# Patient Record
Sex: FEMALE | Race: WHITE | NOT HISPANIC OR LATINO | URBAN - METROPOLITAN AREA
[De-identification: names, ages, dates, MRNs, and addresses within clinical notes are randomized per-mention and may not be internally consistent; named-entity substitution may affect disease eponyms.]

---

## 2017-12-17 ENCOUNTER — EMERGENCY (EMERGENCY)
Facility: HOSPITAL | Age: 72
LOS: 1 days | Discharge: HOME | End: 2017-12-17
Attending: EMERGENCY MEDICINE | Admitting: EMERGENCY MEDICINE
Payer: MEDICARE

## 2017-12-17 VITALS
DIASTOLIC BLOOD PRESSURE: 84 MMHG | TEMPERATURE: 98 F | RESPIRATION RATE: 18 BRPM | HEART RATE: 99 BPM | OXYGEN SATURATION: 99 % | SYSTOLIC BLOOD PRESSURE: 192 MMHG

## 2017-12-17 DIAGNOSIS — Z90.710 ACQUIRED ABSENCE OF BOTH CERVIX AND UTERUS: Chronic | ICD-10-CM

## 2017-12-17 DIAGNOSIS — Z90.49 ACQUIRED ABSENCE OF OTHER SPECIFIED PARTS OF DIGESTIVE TRACT: Chronic | ICD-10-CM

## 2017-12-17 LAB
ALBUMIN SERPL ELPH-MCNC: 4.4 G/DL — SIGNIFICANT CHANGE UP (ref 3.3–5)
ALP SERPL-CCNC: 118 U/L — SIGNIFICANT CHANGE UP (ref 40–120)
ALT FLD-CCNC: 42 U/L RC — SIGNIFICANT CHANGE UP (ref 10–45)
ANION GAP SERPL CALC-SCNC: 15 MMOL/L — SIGNIFICANT CHANGE UP (ref 5–17)
APTT BLD: 27.1 SEC — LOW (ref 27.5–37.4)
AST SERPL-CCNC: 47 U/L — HIGH (ref 10–40)
BASOPHILS # BLD AUTO: 0.1 K/UL — SIGNIFICANT CHANGE UP (ref 0–0.2)
BASOPHILS NFR BLD AUTO: 0.6 % — SIGNIFICANT CHANGE UP (ref 0–2)
BILIRUB SERPL-MCNC: 0.2 MG/DL — SIGNIFICANT CHANGE UP (ref 0.2–1.2)
BUN SERPL-MCNC: 17 MG/DL — SIGNIFICANT CHANGE UP (ref 7–23)
CALCIUM SERPL-MCNC: 9.7 MG/DL — SIGNIFICANT CHANGE UP (ref 8.4–10.5)
CHLORIDE SERPL-SCNC: 100 MMOL/L — SIGNIFICANT CHANGE UP (ref 96–108)
CK MB CFR SERPL CALC: 1.9 NG/ML — SIGNIFICANT CHANGE UP (ref 0–3.8)
CK SERPL-CCNC: 91 U/L — SIGNIFICANT CHANGE UP (ref 25–170)
CO2 SERPL-SCNC: 25 MMOL/L — SIGNIFICANT CHANGE UP (ref 22–31)
CREAT SERPL-MCNC: 0.65 MG/DL — SIGNIFICANT CHANGE UP (ref 0.5–1.3)
D DIMER BLD IA.RAPID-MCNC: 818 NG/ML DDU — HIGH
EOSINOPHIL # BLD AUTO: 0.2 K/UL — SIGNIFICANT CHANGE UP (ref 0–0.5)
EOSINOPHIL NFR BLD AUTO: 1.9 % — SIGNIFICANT CHANGE UP (ref 0–6)
GLUCOSE SERPL-MCNC: 101 MG/DL — HIGH (ref 70–99)
HCT VFR BLD CALC: 41.9 % — SIGNIFICANT CHANGE UP (ref 34.5–45)
HGB BLD-MCNC: 14.4 G/DL — SIGNIFICANT CHANGE UP (ref 11.5–15.5)
INR BLD: 0.95 RATIO — SIGNIFICANT CHANGE UP (ref 0.88–1.16)
LYMPHOCYTES # BLD AUTO: 2.6 K/UL — SIGNIFICANT CHANGE UP (ref 1–3.3)
LYMPHOCYTES # BLD AUTO: 29.7 % — SIGNIFICANT CHANGE UP (ref 13–44)
MAGNESIUM SERPL-MCNC: 2.2 MG/DL — SIGNIFICANT CHANGE UP (ref 1.6–2.6)
MCHC RBC-ENTMCNC: 33.2 PG — SIGNIFICANT CHANGE UP (ref 27–34)
MCHC RBC-ENTMCNC: 34.4 GM/DL — SIGNIFICANT CHANGE UP (ref 32–36)
MCV RBC AUTO: 96.5 FL — SIGNIFICANT CHANGE UP (ref 80–100)
MONOCYTES # BLD AUTO: 0.5 K/UL — SIGNIFICANT CHANGE UP (ref 0–0.9)
MONOCYTES NFR BLD AUTO: 5.8 % — SIGNIFICANT CHANGE UP (ref 2–14)
NEUTROPHILS # BLD AUTO: 5.4 K/UL — SIGNIFICANT CHANGE UP (ref 1.8–7.4)
NEUTROPHILS NFR BLD AUTO: 62 % — SIGNIFICANT CHANGE UP (ref 43–77)
NT-PROBNP SERPL-SCNC: 68 PG/ML — SIGNIFICANT CHANGE UP (ref 0–300)
PHOSPHATE SERPL-MCNC: 3.6 MG/DL — SIGNIFICANT CHANGE UP (ref 2.5–4.5)
PLATELET # BLD AUTO: 312 K/UL — SIGNIFICANT CHANGE UP (ref 150–400)
POTASSIUM SERPL-MCNC: 4.1 MMOL/L — SIGNIFICANT CHANGE UP (ref 3.5–5.3)
POTASSIUM SERPL-SCNC: 4.1 MMOL/L — SIGNIFICANT CHANGE UP (ref 3.5–5.3)
PROT SERPL-MCNC: 7.6 G/DL — SIGNIFICANT CHANGE UP (ref 6–8.3)
PROTHROM AB SERPL-ACNC: 10.3 SEC — SIGNIFICANT CHANGE UP (ref 9.8–12.7)
RBC # BLD: 4.34 M/UL — SIGNIFICANT CHANGE UP (ref 3.8–5.2)
RBC # FLD: 11.4 % — SIGNIFICANT CHANGE UP (ref 10.3–14.5)
SODIUM SERPL-SCNC: 140 MMOL/L — SIGNIFICANT CHANGE UP (ref 135–145)
TROPONIN T SERPL-MCNC: <0.01 NG/ML — SIGNIFICANT CHANGE UP (ref 0–0.06)
WBC # BLD: 8.7 K/UL — SIGNIFICANT CHANGE UP (ref 3.8–10.5)
WBC # FLD AUTO: 8.7 K/UL — SIGNIFICANT CHANGE UP (ref 3.8–10.5)

## 2017-12-17 PROCEDURE — 83880 ASSAY OF NATRIURETIC PEPTIDE: CPT

## 2017-12-17 PROCEDURE — 80053 COMPREHEN METABOLIC PANEL: CPT

## 2017-12-17 PROCEDURE — 82553 CREATINE MB FRACTION: CPT

## 2017-12-17 PROCEDURE — 99284 EMERGENCY DEPT VISIT MOD MDM: CPT | Mod: 25

## 2017-12-17 PROCEDURE — 99285 EMERGENCY DEPT VISIT HI MDM: CPT | Mod: 25

## 2017-12-17 PROCEDURE — 85610 PROTHROMBIN TIME: CPT

## 2017-12-17 PROCEDURE — 93010 ELECTROCARDIOGRAM REPORT: CPT

## 2017-12-17 PROCEDURE — 83735 ASSAY OF MAGNESIUM: CPT

## 2017-12-17 PROCEDURE — 71275 CT ANGIOGRAPHY CHEST: CPT

## 2017-12-17 PROCEDURE — 71275 CT ANGIOGRAPHY CHEST: CPT | Mod: 26

## 2017-12-17 PROCEDURE — 85730 THROMBOPLASTIN TIME PARTIAL: CPT

## 2017-12-17 PROCEDURE — 93005 ELECTROCARDIOGRAM TRACING: CPT

## 2017-12-17 PROCEDURE — 84484 ASSAY OF TROPONIN QUANT: CPT

## 2017-12-17 PROCEDURE — 85379 FIBRIN DEGRADATION QUANT: CPT

## 2017-12-17 PROCEDURE — 84100 ASSAY OF PHOSPHORUS: CPT

## 2017-12-17 PROCEDURE — 93971 EXTREMITY STUDY: CPT

## 2017-12-17 PROCEDURE — 85027 COMPLETE CBC AUTOMATED: CPT

## 2017-12-17 PROCEDURE — 82550 ASSAY OF CK (CPK): CPT

## 2017-12-17 PROCEDURE — 93971 EXTREMITY STUDY: CPT | Mod: 26

## 2017-12-17 RX ORDER — SODIUM CHLORIDE 9 MG/ML
1000 INJECTION INTRAMUSCULAR; INTRAVENOUS; SUBCUTANEOUS ONCE
Qty: 0 | Refills: 0 | Status: COMPLETED | OUTPATIENT
Start: 2017-12-17 | End: 2017-12-17

## 2017-12-17 RX ADMIN — SODIUM CHLORIDE 1000 MILLILITER(S): 9 INJECTION INTRAMUSCULAR; INTRAVENOUS; SUBCUTANEOUS at 23:05

## 2017-12-17 NOTE — ED PROVIDER NOTE - MUSCULOSKELETAL, MLM
Spine appears normal, range of motion is not limited, no muscle or joint tenderness. No palpable masses of popliteal fossa

## 2017-12-17 NOTE — ED PROVIDER NOTE - ATTENDING CONTRIBUTION TO CARE
Attending MD Carranza: I personally have seen and examined this patient.  Resident note reviewed and agree on plan of care and except where noted.  See below for details.    72F with no reported PMH, does not see MDs other than an annual health fair screening presents to the ED with RLE pain.  Indicates R popliteal fossa area, reports pain started on Thursday.  Reports sent to ED from urgent care to rule out DVT.  Reports pain is dull and radiates down lower leg.  Reports worse with standing from sitting/supine.  Reports was sent in because of an elevated D-dimer.  Reports was scheduled to have a duplex tomorrow but given elevated dimer was sent in today.  Denies chest pain, shortness of breath, palpitations. Denies abdominal pain, nausea, vomiting, diarrhea, blood in stools. Denies trauma.  Denies fevers, chills, dizziness, weakness. Denies recent immobilization, surgery.  Reports flew to [place] 3 weeks ago.  ON exam, NAD, head NCAT, PERRL, FROM at neck, no tenderness to palpation or stepoffs along length of spine, lungs CTAB with good inspiratory effort, +S1S2, no m/r/g, abdomen soft with +BS, NT, ND, no CVAT, moving all extremities with 5/5 strength bilateral upper and lower extremities, good and equal  strength bilaterally, no calf tenderness, warmth, or erythema, no gross deformity of RLE, +mild tenderness to R popliteal fossa with no area of fluctuance or induration noted; A/P: 72F with R popliteal fossa pain and elevated dimer, will send for RLE U/S, labs, EKG, reassess

## 2017-12-17 NOTE — ED PROVIDER NOTE - MEDICAL DECISION MAKING DETAILS
Patient is a 73 yo F w/ no PMH (does not have PMD, goes to annual health fair screenings) p/w RLE pain. ?elevated DDimer at urgent care. HD stable. No tachycardia or respiratory distress. Will check cbc, cmp, coags, ddimer, RLE duplex Patient is a 73 yo F w/ no PMH (does not have PMD, goes to annual health fair screenings) p/w RLE pain. ?elevated DDimer at urgent care. HD stable. No tachycardia or respiratory distress. Will check cbc, cmp, coags, ddimer, Kem, BNP, RLE duplex.

## 2017-12-17 NOTE — ED ADULT NURSE NOTE - OBJECTIVE STATEMENT
c/o intermittent pain in the right leg and knee area since november  patient report she got into an elevated car on Thursday and had to put all her weight on her leg.   patient report pain in the leg, and unable to put weight on her leg.  tigre reports she got a call back from the urgent care that her blood work was positive for dvt and advised to come to the emergency room.   patient denies any chest pain, no shortness of breath, no swelling, no redness

## 2017-12-17 NOTE — ED PROVIDER NOTE - CHPI ED SYMPTOMS NEG
no tingling/no weakness/no vomiting/no nausea/no dizziness/no fever/no decreased eating/drinking/no numbness/no chills

## 2017-12-17 NOTE — ED PROVIDER NOTE - OBJECTIVE STATEMENT
Patient is a 71 yo F w/ no PMH (does not have PMD, goes to annual health fair screenings) p/w RLE pain. As per patient, she had R popliteal fossa pain since Thursday. Pain is dull, 1/10 in intensity and radiates down her R shin. Pain is worse with standing from a seated position. Denies any recent long flights or drives (last flight to [place] 3 weeks ago), trauma to leg, SOB, CP, palpitations, fevers, chills. Patient went to urgent care this afternoon and had DDimer drawn with plans for RLE duplex the next morning. Patient received callback from PA regarding elevated DDimer and was told to go to the ED for further eval.

## 2017-12-17 NOTE — ED PROVIDER NOTE - PROGRESS NOTE DETAILS
Duplex negative for DVT. Attending MD Carranza: Discussed U/S results with patient, will obtain CTA Chest r/o PE Attending MD Carranza: Patient re-evaluated no acute issues at  this time.  Lab and radiology tests reviewed with patient and friend.  Findings of nodule, thyroid and intracap rupture discussed with patient.  Patient stable for discharge.  Follow up instructions given, importance of follow up emphasized, return to ED parameters reviewed and patient verbalized understanding.  All questions answered, all concerns addressed.

## 2017-12-18 ENCOUNTER — APPOINTMENT (OUTPATIENT)
Dept: CT IMAGING | Facility: CLINIC | Age: 72
End: 2017-12-18

## 2017-12-18 VITALS
HEART RATE: 85 BPM | DIASTOLIC BLOOD PRESSURE: 92 MMHG | TEMPERATURE: 98 F | OXYGEN SATURATION: 97 % | RESPIRATION RATE: 18 BRPM | SYSTOLIC BLOOD PRESSURE: 162 MMHG

## 2017-12-18 PROBLEM — Z00.00 ENCOUNTER FOR PREVENTIVE HEALTH EXAMINATION: Status: ACTIVE | Noted: 2017-12-18

## 2018-01-16 ENCOUNTER — TRANSCRIPTION ENCOUNTER (OUTPATIENT)
Age: 73
End: 2018-01-16

## 2018-01-16 ENCOUNTER — INPATIENT (INPATIENT)
Facility: HOSPITAL | Age: 73
LOS: 7 days | Discharge: HOME | DRG: 40 | End: 2018-01-24
Attending: PSYCHIATRY & NEUROLOGY | Admitting: PSYCHIATRY & NEUROLOGY
Payer: MEDICARE

## 2018-01-16 VITALS — RESPIRATION RATE: 18 BRPM | DIASTOLIC BLOOD PRESSURE: 90 MMHG | HEART RATE: 92 BPM | SYSTOLIC BLOOD PRESSURE: 162 MMHG

## 2018-01-16 DIAGNOSIS — Z90.710 ACQUIRED ABSENCE OF BOTH CERVIX AND UTERUS: Chronic | ICD-10-CM

## 2018-01-16 DIAGNOSIS — Z90.49 ACQUIRED ABSENCE OF OTHER SPECIFIED PARTS OF DIGESTIVE TRACT: Chronic | ICD-10-CM

## 2018-01-16 LAB
ALBUMIN SERPL ELPH-MCNC: 4.3 G/DL — SIGNIFICANT CHANGE UP (ref 3.3–5)
ALP SERPL-CCNC: 101 U/L — SIGNIFICANT CHANGE UP (ref 40–120)
ALT FLD-CCNC: 30 U/L RC — SIGNIFICANT CHANGE UP (ref 10–45)
ANION GAP SERPL CALC-SCNC: 14 MMOL/L — SIGNIFICANT CHANGE UP (ref 5–17)
APTT BLD: 27.5 SEC — SIGNIFICANT CHANGE UP (ref 27.5–37.4)
AST SERPL-CCNC: 22 U/L — SIGNIFICANT CHANGE UP (ref 10–40)
BASOPHILS # BLD AUTO: 0.1 K/UL — SIGNIFICANT CHANGE UP (ref 0–0.2)
BASOPHILS NFR BLD AUTO: 0.9 % — SIGNIFICANT CHANGE UP (ref 0–2)
BILIRUB SERPL-MCNC: 0.4 MG/DL — SIGNIFICANT CHANGE UP (ref 0.2–1.2)
BUN SERPL-MCNC: 12 MG/DL — SIGNIFICANT CHANGE UP (ref 7–23)
CALCIUM SERPL-MCNC: 9.4 MG/DL — SIGNIFICANT CHANGE UP (ref 8.4–10.5)
CHLORIDE SERPL-SCNC: 102 MMOL/L — SIGNIFICANT CHANGE UP (ref 96–108)
CK MB CFR SERPL CALC: 1 NG/ML — SIGNIFICANT CHANGE UP (ref 0–3.8)
CK SERPL-CCNC: 61 U/L — SIGNIFICANT CHANGE UP (ref 25–170)
CO2 SERPL-SCNC: 25 MMOL/L — SIGNIFICANT CHANGE UP (ref 22–31)
CREAT SERPL-MCNC: 0.59 MG/DL — SIGNIFICANT CHANGE UP (ref 0.5–1.3)
EOSINOPHIL # BLD AUTO: 0.1 K/UL — SIGNIFICANT CHANGE UP (ref 0–0.5)
EOSINOPHIL NFR BLD AUTO: 0.9 % — SIGNIFICANT CHANGE UP (ref 0–6)
GLUCOSE SERPL-MCNC: 107 MG/DL — HIGH (ref 70–99)
HCT VFR BLD CALC: 39.4 % — SIGNIFICANT CHANGE UP (ref 34.5–45)
HGB BLD-MCNC: 13.8 G/DL — SIGNIFICANT CHANGE UP (ref 11.5–15.5)
INR BLD: 1.01 RATIO — SIGNIFICANT CHANGE UP (ref 0.88–1.16)
LYMPHOCYTES # BLD AUTO: 2 K/UL — SIGNIFICANT CHANGE UP (ref 1–3.3)
LYMPHOCYTES # BLD AUTO: 29.1 % — SIGNIFICANT CHANGE UP (ref 13–44)
MCHC RBC-ENTMCNC: 33.5 PG — SIGNIFICANT CHANGE UP (ref 27–34)
MCHC RBC-ENTMCNC: 34.9 GM/DL — SIGNIFICANT CHANGE UP (ref 32–36)
MCV RBC AUTO: 95.9 FL — SIGNIFICANT CHANGE UP (ref 80–100)
MONOCYTES # BLD AUTO: 0.4 K/UL — SIGNIFICANT CHANGE UP (ref 0–0.9)
MONOCYTES NFR BLD AUTO: 6.3 % — SIGNIFICANT CHANGE UP (ref 2–14)
NEUTROPHILS # BLD AUTO: 4.2 K/UL — SIGNIFICANT CHANGE UP (ref 1.8–7.4)
NEUTROPHILS NFR BLD AUTO: 62.9 % — SIGNIFICANT CHANGE UP (ref 43–77)
PLATELET # BLD AUTO: 271 K/UL — SIGNIFICANT CHANGE UP (ref 150–400)
POTASSIUM SERPL-MCNC: 3.8 MMOL/L — SIGNIFICANT CHANGE UP (ref 3.5–5.3)
POTASSIUM SERPL-SCNC: 3.8 MMOL/L — SIGNIFICANT CHANGE UP (ref 3.5–5.3)
PROT SERPL-MCNC: 7.5 G/DL — SIGNIFICANT CHANGE UP (ref 6–8.3)
PROTHROM AB SERPL-ACNC: 11 SEC — SIGNIFICANT CHANGE UP (ref 9.8–12.7)
RBC # BLD: 4.11 M/UL — SIGNIFICANT CHANGE UP (ref 3.8–5.2)
RBC # FLD: 11.2 % — SIGNIFICANT CHANGE UP (ref 10.3–14.5)
SODIUM SERPL-SCNC: 141 MMOL/L — SIGNIFICANT CHANGE UP (ref 135–145)
TROPONIN T SERPL-MCNC: <0.01 NG/ML — SIGNIFICANT CHANGE UP (ref 0–0.06)
WBC # BLD: 6.8 K/UL — SIGNIFICANT CHANGE UP (ref 3.8–10.5)
WBC # FLD AUTO: 6.8 K/UL — SIGNIFICANT CHANGE UP (ref 3.8–10.5)

## 2018-01-16 PROCEDURE — 70496 CT ANGIOGRAPHY HEAD: CPT | Mod: 26

## 2018-01-16 PROCEDURE — 70498 CT ANGIOGRAPHY NECK: CPT | Mod: 26

## 2018-01-16 PROCEDURE — 99285 EMERGENCY DEPT VISIT HI MDM: CPT

## 2018-01-16 RX ORDER — MECLIZINE HCL 12.5 MG
25 TABLET ORAL ONCE
Qty: 0 | Refills: 0 | Status: COMPLETED | OUTPATIENT
Start: 2018-01-16 | End: 2018-01-16

## 2018-01-16 RX ORDER — ACETAMINOPHEN 500 MG
1000 TABLET ORAL ONCE
Qty: 0 | Refills: 0 | Status: COMPLETED | OUTPATIENT
Start: 2018-01-16 | End: 2018-01-16

## 2018-01-16 RX ORDER — ASPIRIN/CALCIUM CARB/MAGNESIUM 324 MG
81 TABLET ORAL DAILY
Qty: 0 | Refills: 0 | Status: DISCONTINUED | OUTPATIENT
Start: 2018-01-16 | End: 2018-01-24

## 2018-01-16 RX ADMIN — Medication 1000 MILLIGRAM(S): at 20:09

## 2018-01-16 RX ADMIN — Medication 400 MILLIGRAM(S): at 17:31

## 2018-01-16 RX ADMIN — Medication 25 MILLIGRAM(S): at 20:18

## 2018-01-16 NOTE — ED CDU PROVIDER INITIAL DAY NOTE - MEDICAL DECISION MAKING DETAILS
Figueroa:  word finding difficulty, now resolved, likely TIA, will need MRI and echo for stroke work up

## 2018-01-16 NOTE — ED ADULT NURSE NOTE - OBJECTIVE STATEMENT
71 y/o female presenting to the ED via EMS complaining of headache x Sunday. Per daughter patient was "acting funny on the phone today." Daughter states patient was not making sense and could not find the words to speak today. On arrival patient finding difficulty remembering certain words. Per daughter patient had right facial droop when she arrived at house. Right facial droop noted on arrival. Code Stroke initiated at 1232. Last seen normal yesterday per daughter. Daughter noted onset of symptoms at 10am. FS done 99. Patient brought to CT. 18G IV established. Positive bilateral strength and sensation noted to all extremities. No weakness noted. Patient denies vision changes, dizziness, numbness, tingling, SOB. A&Ox2, Patient unsure of date. Safety and comfort measures provided. Daughter at bedside.

## 2018-01-16 NOTE — ED ADULT NURSE REASSESSMENT NOTE - NS ED NURSE REASSESS COMMENT FT1
Pt received from KELLY Jean-Baptiste. Pt oriented to CDU & plan of care was discussed. Pt AO2. On examination pt able to answer name and location correctly but does not know birth date, current president or first president of the [place]. Pt also experiencing expressive aphasia. Pt family also agrees that she is not herself. DOUGLAS Hermosillo aware of all symptoms. Safety & comfort measures maintained. Call bell in reach. Will continue to monitor. Pt received from KELLY Jean-Baptiste. Pt oriented to CDU & plan of care was discussed. Pt AO1-2. On examination pt able to answer name and location correctly but does not know birth date, current president or first president of the [place]. Pt also experiencing expressive aphasia. Pt family also agrees that she is not herself. DOUGLAS Hermosillo aware of all symptoms. Safety & comfort measures maintained. Call bell in reach. Will continue to monitor.

## 2018-01-16 NOTE — ED ADULT NURSE REASSESSMENT NOTE - NS ED NURSE REASSESS COMMENT FT1
Pt report received from  Aby MENDOZA. Pt transferred to cdu bed 4 fo neurological observation. Pt a/ox3 denies respiratory distress, sob, dyspnea, C/P, palpitations, n/v/d at this time. Pt states symptoms have improved, currently awaiting MRI and ECHO at this time. VSS, afebrile, IV clean/dry/intact. Pt aware of plan of care, call bell within reach ,safety maintained. Will monitor and assess.

## 2018-01-16 NOTE — ED PROVIDER NOTE - PHYSICAL EXAMINATION
Gen:  alert, awake, no acute distress  HEENT:  atraumatic head, airway clear, pupils equal and round  CV:  rrr, nl S1, S2, no m/r/g  Pulm:  BS equal b/l  Abd: s/nt/nd, +BS  Ext:  moving all extremities  Neuro:  grossly intact, no deficits  Skin:  clear, dry, intact   NIHSS=0

## 2018-01-16 NOTE — ED PROVIDER NOTE - OBJECTIVE STATEMENT
pt seen and evaluated on arrival in RED. pt seen and evaluated on arrival in RED.  pt's daughter reports episode this morning of difficulty speaking and word finding difficulty which has now completely resolved.  pt was aware of this difficulty and thought it might be because she was tired.

## 2018-01-16 NOTE — ED CDU PROVIDER INITIAL DAY NOTE - OBJECTIVE STATEMENT
73yo F with no significant PMH presenting with episode this morning of difficulty speaking and word finding this morning. Per patient's daughter at bedside, patient was disoriented and confused when she called her at 945 this morning, thought she was just tired. Pt's daughter then called by patient's friend also reporting patient's confusion. Pt acknowledges 71yo F with no significant PMH presenting with episode this morning of difficulty speaking, resolved by time patient got to ED. Per patient's daughter at bedside, patient was disoriented and confused when she called her at 945 this morning, thought she was just tired, and was going to call her back. Pt's daughter then called by patient's friend also reporting patient's confusion.  Per daughter, patient typically A&O x 3, never experienced her like this before. Pt acknowledges episode, reports she knew what words she wanted to say but had difficulty speaking them. Also reports associated bitemporal headache x 2 days.  Patient also endorses unsteady gait. Denies fever/chills, vision changes, CP, SOB, abd pain, n/v, numbness/tingling/weakness.   In ED, VSS. Labs unremarkable. Trop negative CTH and CTA negative. OF note, patient with new murmur with no workup. Will admit to CDU for tele, neuro checks, MRI, and TTE.     PCP Tamiko Hernandez 73yo F with no significant PMH presenting with episode this morning of difficulty speaking, resolved by time patient got to ED. Per patient's daughter at bedside, patient was disoriented and confused when she called her at 945 this morning, thought she was just tired, and was going to call her back. Pt's daughter then called by patient's friend also reporting patient's confusion.  Per daughter, patient typically A&O x 3, never experienced her like this before. Pt acknowledges episode, reports she knew what words she wanted to say but had difficulty speaking them. Also reports associated bitemporal headache x 2 days.  Patient also endorses unsteady gait. Denies fever/chills, vision changes, CP, SOB, abd pain, n/v, numbness/tingling/weakness.   In ED, VSS. Labs unremarkable. Trop negative CTH and CTA negative. OF note, patient with recent dx of cardiac murmur with no workup. Will admit to CDU for tele, neuro checks, MRI, and TTE.     PCP Tamiko Hernandez

## 2018-01-16 NOTE — CONSULT NOTE ADULT - SUBJECTIVE AND OBJECTIVE BOX
Neurology Consult    Name  JANINE RESENDIZ    Patient is a 72 year old female w/ no significant PMHx presenting w/ word-finding difficulty and R facial droop since 10PM last night.  She reports that she had been having some word-finding difficulties since last night, however she did not follow up on it and went to bed.  She woke up still having the same symptoms as well as a retro-orbital headache.  Her friend noticed on the phone that the patient was not making sense, and told her daughter about it.  The patient was then brought to the ED for her continued symptoms.  Code stroke called after patient examined in ED, NIHSS 3, MRS 0, tPA not administered as patient out of window, and no intervention due to low NIHSS.                                                          MEDICATIONS  (STANDING):    MEDICATIONS  (PRN):      Allergies    penicillin (Rash)    Intolerances        Objective  Vital Signs Last 24 Hrs  T(C): 36.6 (16 Jan 2018 13:10), Max: 36.6 (16 Jan 2018 13:10)  T(F): 97.9 (16 Jan 2018 13:10), Max: 97.9 (16 Jan 2018 13:10)  HR: 89 (16 Jan 2018 13:10) (89 - 92)  BP: 162/78 (16 Jan 2018 13:10) (162/78 - 162/90)  BP(mean): --  RR: 18 (16 Jan 2018 13:10) (18 - 18)  SpO2: 95% (16 Jan 2018 13:10) (95% - 95%)    General Exam   General appearance: No acute distress, well-nourished  Respiratory:    non-labored respirations               Neurological Exam  Mental Status:  alert and oriented x3, fluent speech, following commands, repetition and naming intact    Cranial Nerves: PERRL, EOMI without nystagmus, visual fields intact, mild R facial droop, no dysarthria    Motor:   Tone:   normal               Strength:  Upper extremity                          Delt       Bicep    Tricep                                                  R         5/5        5/5        5/5       5/5                                               L          5/5        5/5        5/5      5/5    Lower extremity                           HF          KE          KF        DF         PF                                               R        5/5        5/5        5/5       5/5       5/5                                               L         5/5        5/5        5/5      5/5        5/5    Pronator drift:   none           Dysmetria: none with finger-to-nose testing  Tremor:  none appreciated at rest or in action    Sensation: intact grossly to light touch    Deep Tendon Reflexes: 2+ throughout  Toes flexor bilaterally     Gait: normal    Other Studies    01-16    141  |  102  |  12  ----------------------------<  107<H>  3.8   |  25  |  0.59    Ca    9.4      16 Jan 2018 13:03    TPro  7.5  /  Alb  4.3  /  TBili  0.4  /  DBili  x   /  AST  22  /  ALT  30  /  AlkPhos  101  01-16 01-16    141  |  102  |  12  ----------------------------<  107<H>  3.8   |  25  |  0.59    Ca    9.4      16 Jan 2018 13:03    TPro  7.5  /  Alb  4.3  /  TBili  0.4  /  DBili  x   /  AST  22  /  ALT  30  /  AlkPhos  101  01-16    LIVER FUNCTIONS - ( 16 Jan 2018 13:03 )  Alb: 4.3 g/dL / Pro: 7.5 g/dL / ALK PHOS: 101 U/L / ALT: 30 U/L RC / AST: 22 U/L / GGT: x             Radiology    CTH:  Mild atrophy and small vessel white matter ischemic changes.   Normal CTA of the head and neck. No large vessel occlusion. No carotid or   vertebral stenosis in the neck using NASCET criteria.

## 2018-01-16 NOTE — CONSULT NOTE ADULT - ASSESSMENT
72 year old female w/ no significant PMHx presenting w/ word-finding difficulty and R facial droop since 10PM last night, s/p code stroke, NIHSS 3, MRS 0, no tPA administered as patient out of window, no intervention as patient NIHSS was too low.    Plan:  - MRI brain w/o contrast  - TTE w/ bubble 72 year old female w/ no significant PMHx presenting w/ word-finding difficulty and R facial droop since 10PM last night, s/p code stroke, NIHSS 3, MRS 0, no tPA administered as patient out of window, no intervention as patient NIHSS was too low.  Neurological exam significant for R facial droop, otherwise non-focal.  CTH and CTA head/neck showing no acute abnormalities.    Plan:  - MRI brain w/o contrast  - TTE w/ bubble

## 2018-01-16 NOTE — ED CDU PROVIDER INITIAL DAY NOTE - PROGRESS NOTE DETAILS
Patient resting in bed comfortably. NAD. Reports bitemporal HA returning. VSS. No events on tele. Will give tylenol and re-assess. MRI scheduled for tomorrow morning, 0930. CDU NOTE DOUGLAS Sanderson: pt resting comfortably, reports still feeling dizzy and unsteady. not room-spinning. not pre-syncopal. no other complaints. NAD VSS. no events on tele. neuro exam- no focal deficit.

## 2018-01-17 DIAGNOSIS — R47.01 APHASIA: ICD-10-CM

## 2018-01-17 LAB
APPEARANCE UR: CLEAR — SIGNIFICANT CHANGE UP
BILIRUB UR-MCNC: NEGATIVE — SIGNIFICANT CHANGE UP
CHOLEST SERPL-MCNC: 194 MG/DL — SIGNIFICANT CHANGE UP (ref 10–199)
COLOR SPEC: YELLOW — SIGNIFICANT CHANGE UP
DIFF PNL FLD: NEGATIVE — SIGNIFICANT CHANGE UP
EPI CELLS # UR: SIGNIFICANT CHANGE UP /HPF
GLUCOSE UR QL: NEGATIVE — SIGNIFICANT CHANGE UP
HBA1C BLD-MCNC: 5.4 % — SIGNIFICANT CHANGE UP (ref 4–5.6)
HDLC SERPL-MCNC: 72 MG/DL — SIGNIFICANT CHANGE UP (ref 40–125)
KETONES UR-MCNC: ABNORMAL
LEUKOCYTE ESTERASE UR-ACNC: ABNORMAL
LIPID PNL WITH DIRECT LDL SERPL: 103 MG/DL — SIGNIFICANT CHANGE UP
NITRITE UR-MCNC: NEGATIVE — SIGNIFICANT CHANGE UP
PH UR: 5.5 — SIGNIFICANT CHANGE UP (ref 5–8)
PROT UR-MCNC: NEGATIVE — SIGNIFICANT CHANGE UP
SP GR SPEC: 1.02 — SIGNIFICANT CHANGE UP (ref 1.01–1.02)
TOTAL CHOLESTEROL/HDL RATIO MEASUREMENT: 2.7 RATIO — LOW (ref 3.3–7.1)
TRIGL SERPL-MCNC: 95 MG/DL — SIGNIFICANT CHANGE UP (ref 10–149)
UROBILINOGEN FLD QL: NEGATIVE — SIGNIFICANT CHANGE UP
WBC UR QL: SIGNIFICANT CHANGE UP /HPF (ref 0–5)

## 2018-01-17 PROCEDURE — 70551 MRI BRAIN STEM W/O DYE: CPT | Mod: 26

## 2018-01-17 PROCEDURE — 99223 1ST HOSP IP/OBS HIGH 75: CPT

## 2018-01-17 RX ORDER — SODIUM CHLORIDE 9 MG/ML
1000 INJECTION INTRAMUSCULAR; INTRAVENOUS; SUBCUTANEOUS
Qty: 0 | Refills: 0 | Status: DISCONTINUED | OUTPATIENT
Start: 2018-01-17 | End: 2018-01-24

## 2018-01-17 RX ORDER — DIAZEPAM 5 MG
5 TABLET ORAL ONCE
Qty: 0 | Refills: 0 | Status: DISCONTINUED | OUTPATIENT
Start: 2018-01-17 | End: 2018-01-18

## 2018-01-17 RX ORDER — CLOPIDOGREL BISULFATE 75 MG/1
75 TABLET, FILM COATED ORAL DAILY
Qty: 0 | Refills: 0 | Status: DISCONTINUED | OUTPATIENT
Start: 2018-01-17 | End: 2018-01-20

## 2018-01-17 RX ORDER — ATORVASTATIN CALCIUM 80 MG/1
80 TABLET, FILM COATED ORAL AT BEDTIME
Qty: 0 | Refills: 0 | Status: DISCONTINUED | OUTPATIENT
Start: 2018-01-17 | End: 2018-01-24

## 2018-01-17 RX ORDER — ENOXAPARIN SODIUM 100 MG/ML
40 INJECTION SUBCUTANEOUS EVERY 24 HOURS
Qty: 0 | Refills: 0 | Status: DISCONTINUED | OUTPATIENT
Start: 2018-01-17 | End: 2018-01-24

## 2018-01-17 RX ORDER — SENNA PLUS 8.6 MG/1
2 TABLET ORAL AT BEDTIME
Qty: 0 | Refills: 0 | Status: DISCONTINUED | OUTPATIENT
Start: 2018-01-17 | End: 2018-01-24

## 2018-01-17 RX ORDER — ACETAMINOPHEN 500 MG
650 TABLET ORAL EVERY 6 HOURS
Qty: 0 | Refills: 0 | Status: DISCONTINUED | OUTPATIENT
Start: 2018-01-17 | End: 2018-01-24

## 2018-01-17 RX ADMIN — SODIUM CHLORIDE 60 MILLILITER(S): 9 INJECTION INTRAMUSCULAR; INTRAVENOUS; SUBCUTANEOUS at 10:06

## 2018-01-17 RX ADMIN — SENNA PLUS 2 TABLET(S): 8.6 TABLET ORAL at 21:16

## 2018-01-17 RX ADMIN — Medication 650 MILLIGRAM(S): at 11:00

## 2018-01-17 RX ADMIN — Medication 650 MILLIGRAM(S): at 10:14

## 2018-01-17 RX ADMIN — ENOXAPARIN SODIUM 40 MILLIGRAM(S): 100 INJECTION SUBCUTANEOUS at 05:35

## 2018-01-17 RX ADMIN — CLOPIDOGREL BISULFATE 75 MILLIGRAM(S): 75 TABLET, FILM COATED ORAL at 11:28

## 2018-01-17 RX ADMIN — ATORVASTATIN CALCIUM 80 MILLIGRAM(S): 80 TABLET, FILM COATED ORAL at 21:16

## 2018-01-17 RX ADMIN — Medication 81 MILLIGRAM(S): at 11:28

## 2018-01-17 NOTE — DISCHARGE NOTE ADULT - NS AS DC STROKE ED MATERIALS
Risk Factors for Stroke/Prescribed Medications/Stroke Education Booklet/Stroke Warning Signs and Symptoms/Call 911 for Stroke/Need for Followup After Discharge

## 2018-01-17 NOTE — ED ADULT NURSE REASSESSMENT NOTE - NIH STROKE SCALE: 9. BEST LANGUAGE, QM
(1) Mild-to-moderate aphasia; some obvious loss of fluency or facility of comprehension, w/o significant limitation on ideas expressed or form of expression. Reduction of speech and/or comprehension, however, makes conversation about provided material difficult or impossible.  For example, in conversation about provided materials, examiner can identify picture or naming card content from patient's response.
(1) Mild-to-moderate aphasia; some obvious loss of fluency or facility of comprehension, w/o significant limitation on ideas expressed or form of expression. Reduction of speech and/or comprehension, however, makes conversation about provided material difficult or impossible.  For example, in conversation about provided materials, examiner can identify picture or naming card content from patient's response.

## 2018-01-17 NOTE — SPEECH LANGUAGE PATHOLOGY EVALUATION - SLP GESTURES
head nod/question interference of possible limb apraxia affecting ability to use gesture for communication and selection of multiple choice selection./head shake

## 2018-01-17 NOTE — ED CDU PROVIDER SUBSEQUENT DAY NOTE - MEDICAL DECISION MAKING DETAILS
71yo F with no significant PMH presenting with episode of difficulty speaking, resolved by time patient got to ED. Pt was disoriented and confused when she is typically A&O x 3 Also reports associated bitemporal headache x 2 days.  Patient also endorses unsteady gait. Denies fever/chills, vision changes, CP, SOB, abd pain, n/v, numbness/tingling/weakness.   	In ED, VSS. Labs unremarkable. Trop negative CTH and CTA negative. OF note, patient with recent dx of cardiac murmur with no workup. Will admit to CDU for tele, neuro checks, MRI, and TTE.  While in CDU pt had recurrence of symptoms + dysarthria aox1 cn2-12 intact clear lungs rrr – concern for tia/stroke – will admit for further workup.   Geraldo Montague M.D., Attending Physician

## 2018-01-17 NOTE — DISCHARGE NOTE ADULT - CARE PROVIDER_API CALL
Wiliam Paez (MBBS), Neurology; Vascular Neurology  [address] [address]  Phone: [phone]  Fax: [phone]

## 2018-01-17 NOTE — SPEECH LANGUAGE PATHOLOGY EVALUATION - SLP BEHAVIORAL OBSERVATIONS
Alert, generally cooperative, appeared initially appropriate and willing to participate. However, toward end of assessment, Pt noted to turn away and close her eyes, as if to go to sleep.

## 2018-01-17 NOTE — H&P ADULT - ASSESSMENT
Patient is a 72 year old female w/ no significant PMHx presenting w/ word-finding difficulty and R facial droop since 10PM last night.  She reports that she had been having some word-finding difficulties since last night. Exam is significant for expressive aphasia. CT/CTA of head and neck were remarkable only for mild atrophy and small vessel ischemia. Likely small Broca's Area stroke. NIHSS 3, MRS 0.    Plan:  - C/w Tele monitoring  - Neuro Checks Q4hr  - MRI Head w/o  - TTE w/ bubble study  - PT/OT  - Start Aspirin 81mg PO QD and Lipitor 80mg PO HS  - Lipid Panel and HbA1c  - Gradual Normotension  - DVT ppx

## 2018-01-17 NOTE — SPEECH LANGUAGE PATHOLOGY EVALUATION - SLP OBSERVATIONS
? brief groping and inability to initiate motor movements for speech in a couple of instances during eval

## 2018-01-17 NOTE — ED CDU PROVIDER DISPOSITION NOTE - CLINICAL COURSE
73 y/o F no pmhx came to ED with AMS, dysarthria. in ED, code stroke called. pt seen by Neuro- had CT/CTA head and neck that was negative for stroke. Neurology recommended patient come to CDU for further work-up. Patient was sent to CDU for Q4 neuro checks, telemetry monitoring and MRI brain, as well as continued neurological evaluation. Pt had waxing and waning of dysarthria and expressive aphasia. Neurology called to reevaluate. As per neurology resident- will admit for further work-up. discussed with ED Dr. Montague- agrees with plan.

## 2018-01-17 NOTE — DISCHARGE NOTE ADULT - CARE PLAN
Principal Discharge DX:	CVA (cerebral vascular accident)  Secondary Diagnosis:	Thyroid nodule  Assessment and plan of treatment:	Follow up with your primary physician for Thyroid ultrasound as outpatient Principal Discharge DX:	CVA (cerebral vascular accident)  Secondary Diagnosis:	Thyroid nodule  Assessment and plan of treatment:	There was a thyroid nodule seen on CT of the neck. Please follow up with your primary physician for Thyroid ultrasound as outpatient Principal Discharge DX:	Encephalopathy acute  Goal:	Resolution of symptoms  Assessment and plan of treatment:	Had aphasia of unknown cause. Possible non-imaged stroke vs viral encephalitis.  Continue with medications as prescribed.   Follow up with vascular neurology in 1-2 weeks.  Secondary Diagnosis:	Thyroid nodule  Assessment and plan of treatment:	There was a thyroid nodule seen on CT of the neck. Please follow up with your primary physician for Thyroid ultrasound as outpatient

## 2018-01-17 NOTE — SPEECH LANGUAGE PATHOLOGY EVALUATION - COMMENTS
Per Neuro: CT/CTA of head and neck were remarkable only for mild atrophy and small vessel ischemia. Likely small Broca's Area stroke. NIHSS 3, MRS 0. Exam: Alert and attentive; speech mildly or mild-moderately disfluent, with frequent word finding pauses, occasional word substitutions; able to follow one-step commands but not across midline; repetition mildly impaired; mild right central facial palsy; remainder of neuro exam nonfocal. Lower extremity venous Doppler (1/16/18) negative for DVT. Impression. On 1/15/18 she developed mild-moderate mixed aphasia and subtle right hemiparesis. 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 She has no significant cerebrovascular atherosclerosis, and cardioembolism should be screened for. Rx rehabilitation including speech therapy. Per RN report, when family was not in the room, Pt was noted to have climbed out of bed over the railings, urinated on the floor, and was seated in the chair at the bedside. Unclear exactly what occurred. However, highly suspect Pt with reduced safety awareness and insight into deficits. When attempting orientation tasks, with models and repetitions, Pt noted to repeat, "My name is," but unable to state name. Unable to assess fully due to severity of expressive and receptive language deficits. However, given events reported by nursing, as noted above, Pt appears to demonstrate reduced safety awareness and poor insight into deficits.

## 2018-01-17 NOTE — SPEECH LANGUAGE PATHOLOGY EVALUATION - SLP REPETITION
0/3 syllables, able to say /a/ when prompted to open mouth with tongue depressor as visual cue; 0/3 single words

## 2018-01-17 NOTE — ED CDU PROVIDER SUBSEQUENT DAY NOTE - ATTENDING CONTRIBUTION TO CARE
73yo F with no significant PMH presenting with episode of difficulty speaking, resolved by time patient got to ED. Pt was disoriented and confused when she is typically A&O x 3 Also reports associated bitemporal headache x 2 days.  Patient also endorses unsteady gait. Denies fever/chills, vision changes, CP, SOB, abd pain, n/v, numbness/tingling/weakness.   	In ED, VSS. Labs unremarkable. Trop negative CTH and CTA negative. OF note, patient with recent dx of cardiac murmur with no workup. Will admit to CDU for tele, neuro checks, MRI, and TTE.  While in CDU pt had recurrence of symptoms + dysarthria aox1 cn2-12 intact clear lungs rrr – concern for tia/stroke – will admit for further workup.   Geraldo Montague M.D., Attending Physician

## 2018-01-17 NOTE — SPEECH LANGUAGE PATHOLOGY EVALUATION - SLP DIAGNOSIS
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 Pt with limited verbal production, noted to spontaneously say only "yes, ok, no" throughout assessment. Pt with poor yes/no reliability for simple/personal questions, inconsistent command following for simple, inconsistent object identification by name and function in field of two choices. Question possible component of verbal apraxia. Question component of limb apraxia (defer to PT/OT for further assessment) which may interfere with command following and item selection. Suspect some component of cognitive-linguistic deficits including reduced safety awareness and poor insight into deficits. However, cannot fully assess cognition due to severity of linguistic deficits.

## 2018-01-17 NOTE — ED ADULT NURSE REASSESSMENT NOTE - NS ED NURSE REASSESS COMMENT FT1
Pt AO1-2. On examination pt doesn't know birth date, age, current president, current location, year, month. Pt knows her name and daughter's name. Pt also still having episodes of expressive aphasia. DOUGLAS Hermosillo aware. Pt Safety maintained. Will continue to monitor.

## 2018-01-17 NOTE — SPEECH LANGUAGE PATHOLOGY EVALUATION - SLP ORIENTATION
Unable to demonstrate orientation to place or time, suspected interference of linguistic deficits. Pt inconsistently able to demonstrate orientation to her own name with multiple yes/no options (5/6 trials of yes/no choices).

## 2018-01-17 NOTE — SPEECH LANGUAGE PATHOLOGY EVALUATION - SLP PERTINENT HISTORY OF CURRENT PROBLEM
72 year old female w/ no significant PMHx presenting w/ word-finding difficulty and R facial droop since 10PM last night.  She reports that she had been having some word-finding difficulties since last night, however she did not follow up on it and went to bed.  She woke up still having the same symptoms as well as a retro-orbital headache.  Her friend noticed on the phone that the patient was not making sense, and told her daughter about it.  The patient was then brought to the ED for her continued symptoms.  Code stroke called after patient examined in ED, NIHSS 3, MRS 0, tPA not administered as patient out of window, and no intervention due to low NIHSS. Symptoms returned and intensified while in CDU, decision was made to admit pt at that time. No recent fevers, chills, dizziness, changes in vision, weakness, numbness, tingling, CP, SOB, cough, nausea/vomiting, abdominal pain, changes in BMs/urination.

## 2018-01-17 NOTE — H&P ADULT - NSHPPHYSICALEXAM_GEN_ALL_CORE
General Exam:   General appearance: No acute distress    Neurological Exam:  Mental Status: Orientated to self and place.  Attention intact.  No dysarthria. Mild to Moderate Expressive Aphasia.  Cranial Nerves:   PERRL, EOMI, VFF, no nystagmus.    CN V1-3 intact to light touch b/l.  No facial asymmetry.  Hearing intact to finger rub bilaterally.  Tongue, uvula and palate midline.  Sternocleidomastoid and Trapezius intact bilaterally.    Motor:   Tone: normal.                  Strength:     [] Upper extremity                      Delt       Bicep    Tricep                                                  R         5/5 5/5 5/5 5/5                                               L          5/5 5/5 5/5 5/5  [] Lower extremity                       HF          KE          KF        DF         PF                                               R        5/5 5/5 5/5 5/5 5/5                                               L         5/5 5/5 5/5 5/5 5/5  Pronator drift: none                 Dysmetria: None to finger-nose-finger b/l  No truncal ataxia.    Tremor: No resting, postural or action tremor.  No myoclonus.    Sensation: extinction to touch on the LUE and LLE    Deep Tendon Reflexes:              Biceps     BR        Patellar        Ankle       Babinski                                         R       2+          2+        2+               2+           downgoing                                         L        2+          2+        2+               2+           downgoing    Gait: normal.

## 2018-01-17 NOTE — DISCHARGE NOTE ADULT - ADDITIONAL INSTRUCTIONS
Take aspirin and Plavix together for three weeks followed by aspirin alone. Follow up with neurology. Follow up with your primary doctor.

## 2018-01-17 NOTE — H&P ADULT - HISTORY OF PRESENT ILLNESS
Patient is a 72 year old female w/ no significant PMHx presenting w/ word-finding difficulty and R facial droop since 10PM last night.  She reports that she had been having some word-finding difficulties since last night, however she did not follow up on it and went to bed.  She woke up still having the same symptoms as well as a retro-orbital headache.  Her friend noticed on the phone that the patient was not making sense, and told her daughter about it.  The patient was then brought to the ED for her continued symptoms.  Code stroke called after patient examined in ED, NIHSS 3, MRS 0, tPA not administered as patient out of window, and no intervention due to low NIHSS. Symptoms returned and intensified while in CDU, decision was made to admit pt at that time. No recent fevers, chills, dizziness, changes in vision, weakness, numbness, tingling, CP, SOB, cough, nausea/vomiting, abdominal pain, changes in BMs/urination.

## 2018-01-17 NOTE — DISCHARGE NOTE ADULT - PLAN OF CARE
Follow up with your primary physician for Thyroid ultrasound as outpatient There was a thyroid nodule seen on CT of the neck. Please follow up with your primary physician for Thyroid ultrasound as outpatient Resolution of symptoms Had aphasia of unknown cause. Possible non-imaged stroke vs viral encephalitis.  Continue with medications as prescribed.   Follow up with vascular neurology in 1-2 weeks.

## 2018-01-17 NOTE — SPEECH LANGUAGE PATHOLOGY EVALUATION - BODY PART ID
1/10; improved with repetitions to 2/10; improved with visual cues and models and tactile cues to 6/10 (total)

## 2018-01-17 NOTE — DISCHARGE NOTE ADULT - PATIENT PORTAL LINK FT
“You can access the [facility] Patient Portal, offered by [facility], by registering with the following website: [email or web address]”

## 2018-01-17 NOTE — DISCHARGE NOTE ADULT - MEDICATION SUMMARY - MEDICATIONS TO TAKE
I will START or STAY ON the medications listed below when I get home from the hospital:    acetaminophen 325 mg oral tablet  -- 2 tab(s) by mouth every 6 hours, As needed, Mild Pain (1 - 3)  -- Indication: For pain as needed    aspirin 81 mg oral tablet, chewable  -- 1 tab(s) by mouth once a day  -- Indication: For stroke    lisinopril 5 mg oral tablet  -- 1 tab(s) by mouth once a day  -- Indication: For hypertension    atorvastatin 80 mg oral tablet  -- 1 tab(s) by mouth once a day (at bedtime)  -- Indication: For HLD    Plavix 75 mg oral tablet  -- 1 tab(s) by mouth once a day   -- Do not take aspirin or aspirin containing products without knowledge and consent of your physician.    -- Indication: For CVA (cerebral vascular accident)    Senna 8.6 mg oral tablet  -- 1 tab(s) by mouth once a day (at bedtime)  -- Indication: For bowel regimen I will START or STAY ON the medications listed below when I get home from the hospital:    Physical Therapy  -- Physical therapy  Dx: Stroke/ encephalopathy  Indication: Strengthening, gait, balance training  2-3 x/week    -- Indication: For Stroke     acetaminophen 325 mg oral tablet  -- 2 tab(s) by mouth every 6 hours, As needed, Mild Pain (1 - 3)  -- Indication: For pain as needed    aspirin 81 mg oral tablet, chewable  -- 1 tab(s) by mouth once a day  -- Indication: For stroke    lisinopril 5 mg oral tablet  -- 1 tab(s) by mouth once a day  -- Indication: For hypertension    atorvastatin 80 mg oral tablet  -- 1 tab(s) by mouth once a day (at bedtime)  -- Indication: For HLD    Plavix 75 mg oral tablet  -- 1 tab(s) by mouth once a day   -- Do not take aspirin or aspirin containing products without knowledge and consent of your physician.    -- Indication: For CVA (cerebral vascular accident)    Senna 8.6 mg oral tablet  -- 1 tab(s) by mouth once a day (at bedtime)  -- Indication: For bowel regimen

## 2018-01-17 NOTE — DISCHARGE NOTE ADULT - HOSPITAL COURSE
Patient is a 72 year old female w/ no significant PMHx presenting w/ word-finding difficulty and R facial droop since 10PM last night.  She reports that she had been having some word-finding difficulties since last night, however she did not follow up on it and went to bed.  She woke up still having the same symptoms as well as a retro-orbital headache.  Her friend noticed on the phone that the patient was not making sense, and told her daughter about it.  The patient was then brought to the ED for her continued symptoms.  Code stroke called after patient examined in ED, NIHSS 3, MRS 0, tPA not administered as patient out of window, and no intervention due to low NIHSS. Symptoms returned and intensified while in CDU, decision was made to admit pt at that time. No recent fevers, chills, dizziness, changes in vision, weakness, numbness, tingling, CP, SOB, cough, nausea/vomiting, abdominal pain, changes in BMs/urination. Patient is a 72 year old female w/ no significant PMHx presenting w/ word-finding difficulty and R facial droop since 10PM last night.  She reports that she had been having some word-finding difficulties since last night, however she did not follow up on it and went to bed.  She woke up still having the same symptoms as well as a retro-orbital headache.  Her friend noticed on the phone that the patient was not making sense, and told her daughter about it.  The patient was then brought to the ED for her continued symptoms.  Code stroke called after patient examined in ED, NIHSS 3, MRS 0, tPA not administered as patient out of window, and no intervention due to low NIHSS. Symptoms returned and intensified while in CDU, decision was made to admit pt at that time. No recent fevers, chills, dizziness, changes in vision, weakness, numbness, tingling, CP, SOB, cough, nausea/vomiting, abdominal pain, changes in BMs/urination.    On examination pt had a mild right nasolabial fold flattening and a mixed aphasia. Pt was started on Aspirin and Plavix per [facility] protocol and a LOOP monitor was placed.    CT head, CTA  head and neck were negative for any significant findings, and negative MRI for acute stroke. Initially believed to be an imaging negative stroke. Pt had episode of urinary incontinence and gait was off. Pt had EEG to rule out seizure, which was normal. Over the course of a week pt had improvement in her speech fluency and mentation. LP was performed to rule out meningitic process.  There was abnormal findings of elevated protien of 154 and nucleated cell count of 104 with lymphocytic predominance, which was suggestive of a viral process. ID consulted for recommendations. Patient is a 72 year old female w/ no significant PMHx presenting w/ word-finding difficulty and R facial droop since 10PM last night.  She reports that she had been having some word-finding difficulties since last night, however she did not follow up on it and went to bed.  She woke up still having the same symptoms as well as a retro-orbital headache.  Her friend noticed on the phone that the patient was not making sense, and told her daughter about it.  The patient was then brought to the ED for her continued symptoms.  Code stroke called after patient examined in ED, NIHSS 3, MRS 0, tPA not administered as patient out of window, and no intervention due to low NIHSS. Symptoms returned and intensified while in CDU, decision was made to admit pt at that time. No recent fevers, chills, dizziness, changes in vision, weakness, numbness, tingling, CP, SOB, cough, nausea/vomiting, abdominal pain, changes in BMs/urination.    On examination pt had a mild right nasolabial fold flattening and a mixed aphasia. Pt was started on Aspirin and Plavix per [facility] protocol and a LOOP monitor was placed.    CT head, CTA  head and neck were negative for any significant findings, and negative MRI for acute stroke. Initially believed to be an imaging negative stroke. Pt had episode of urinary incontinence and gait was off. Pt had EEG to rule out seizure, which was normal. Over the course of a week pt had improvement in her speech fluency and mentation. LP was performed to rule out meningitic process.  There was abnormal findings of elevated protien of 154 and nucleated cell count of 104 with lymphocytic predominance, which was suggestive of a viral process. ID consulted for recommendations. CSF meningitis panel, cryptococcal antigen and Lyme were negative. No antivirals were recommended. Etiology of acute onset aphsia Patient is a 72 year old female w/ no significant PMHx presenting w/ word-finding difficulty and R facial droop since 10PM last night.  She reports that she had been having some word-finding difficulties since last night, however she did not follow up on it and went to bed.  She woke up still having the same symptoms as well as a retro-orbital headache.  Her friend noticed on the phone that the patient was not making sense, and told her daughter about it.  The patient was then brought to the ED for her continued symptoms.  Code stroke called after patient examined in ED, NIHSS 3, MRS 0, tPA not administered as patient out of window, and no intervention due to low NIHSS. Symptoms returned and intensified while in CDU, decision was made to admit pt at that time. No recent fevers, chills, dizziness, changes in vision, weakness, numbness, tingling, CP, SOB, cough, nausea/vomiting, abdominal pain, changes in BMs/urination.    On examination pt had a mild right nasolabial fold flattening and a mixed aphasia. Pt was started on Aspirin and Plavix per [facility] protocol and a LOOP monitor was placed.    CT head, CTA  head and neck were negative for any significant findings, and negative MRI for acute stroke. Initially believed to be an imaging negative stroke. Pt had episode of urinary incontinence and gait was off. Pt had EEG to rule out seizure, which was normal. Over the course of a week pt had improvement in her speech fluency and mentation. LP was performed to rule out meningitic process.  There was abnormal findings of elevated protien of 154 and nucleated cell count of 104 with lymphocytic predominance, which was suggestive of a viral process. ID consulted for recommendations. CSF meningitis panel, cryptococcal antigen and Lyme were negative. No antivirals were recommended. Etiology of acute onset aphasia either no-imaged stroke or non- HCV viral encephalitis which resolved.   Pt improved back to baseline. Discharged with neurology follow up

## 2018-01-17 NOTE — H&P ADULT - ATTENDING COMMENTS
72-year-old right-handed white lady first evaluated at [facility] on 11/17/18 with aphasia. On 1/15/18, she developed sudden speech difficulty including word finding difficulty. She went to sleep and awoke the next morning with similar or even worse symptoms. There was an associated severe headache. Medications at home included aspirin daily. ROS otherwise negative. Exam. Alert and attentive; speech mildly or mild-moderately disfluent, with frequent word finding pauses, occasional word substitutions; able to follow one-step commands but not across midline; repetition mildly impaired; mild right central facial palsy; remainder of neurologic exam was nonfocal. CT head/CTA neck and head (1/16/18) to my eye was unremarkable. Lower extremity venous Doppler (1/16/18) was negative for DVT. Impression. On 1/15/18 she developed mild-moderate mixed aphasia and subtle right hemiparesis. 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 She has no significant cerebrovascular atherosclerosis, and cardioembolism should be screened for. Plan. MRI brain; TTE; if MRI shows scattered infarcts in different vascular distributions, may proceed with ALBERTO; likely should have an implantable cardiac loop recorder; aspirin/Plavix for 3 weeks, then stop Plavix; rehabilitation including speech therapy.

## 2018-01-17 NOTE — ED CDU PROVIDER SUBSEQUENT DAY NOTE - HISTORY
CDU NOTE DOUGLAS Sanderson: pt resting, when asked how she is feeling, answers "not so good" and answers yes to mild dizziness still.  pt having difficulty answering questions. A&O x 1 to self. upon asking her age and current month, pt answers inappropriately "[age]" with difficulty. NAD recent VSS. no events on tele. neuro exam- A&Ox1, +dysarthria with answering questions, otherwise exam non-focal. Called ED attending Dr. Montague- recommends sending UA and CXR and notifying Neuro.   Called and Notified Neuro of patient's AMS. As per Resident- will come to CDU and reevaluate patient.

## 2018-01-17 NOTE — H&P ADULT - NSHPLABSRESULTS_GEN_ALL_CORE
Vital Signs Last 24 Hrs  T(C): 37 (17 Jan 2018 00:39), Max: 37.2 (16 Jan 2018 14:35)  T(F): 98.6 (17 Jan 2018 00:39), Max: 99 (16 Jan 2018 14:35)  HR: 67 (17 Jan 2018 00:39) (67 - 92)  BP: 137/72 (17 Jan 2018 00:39) (137/72 - 162/90)  BP(mean): --  RR: 17 (17 Jan 2018 00:39) (17 - 18)  SpO2: 97% (17 Jan 2018 00:39) (95% - 98%)    01-16    141  |  102  |  12  ----------------------------<  107<H>  3.8   |  25  |  0.59    Ca    9.4      16 Jan 2018 13:03    TPro  7.5  /  Alb  4.3  /  TBili  0.4  /  DBili  x   /  AST  22  /  ALT  30  /  AlkPhos  101  01-16 01-16    141  |  102  |  12  ----------------------------<  107<H>  3.8   |  25  |  0.59    Ca    9.4      16 Jan 2018 13:03    TPro  7.5  /  Alb  4.3  /  TBili  0.4  /  DBili  x   /  AST  22  /  ALT  30  /  AlkPhos  101  01-16    LIVER FUNCTIONS - ( 16 Jan 2018 13:03 )  Alb: 4.3 g/dL / Pro: 7.5 g/dL / ALK PHOS: 101 U/L / ALT: 30 U/L RC / AST: 22 U/L / GGT: x                                 13.8   6.8   )-----------( 271      ( 16 Jan 2018 13:03 )             39.4     Radiology:  < from: CT Angio Neck w/ IV Cont (01.16.18 @ 13:21) >    Mild atrophy and small vessel white matter ischemic changes.   Normal CTA of the head and neck. No large vessel occlusion. No carotid or   vertebral stenosis in the neck using NASCET criteria.

## 2018-01-18 LAB
ANION GAP SERPL CALC-SCNC: 15 MMOL/L — SIGNIFICANT CHANGE UP (ref 5–17)
BUN SERPL-MCNC: 13 MG/DL — SIGNIFICANT CHANGE UP (ref 7–23)
CALCIUM SERPL-MCNC: 9.5 MG/DL — SIGNIFICANT CHANGE UP (ref 8.4–10.5)
CHLORIDE SERPL-SCNC: 104 MMOL/L — SIGNIFICANT CHANGE UP (ref 96–108)
CHOLEST SERPL-MCNC: 210 MG/DL — HIGH (ref 10–199)
CO2 SERPL-SCNC: 23 MMOL/L — SIGNIFICANT CHANGE UP (ref 22–31)
CREAT SERPL-MCNC: 0.49 MG/DL — LOW (ref 0.5–1.3)
GLUCOSE SERPL-MCNC: 97 MG/DL — SIGNIFICANT CHANGE UP (ref 70–99)
HCT VFR BLD CALC: 39.6 % — SIGNIFICANT CHANGE UP (ref 34.5–45)
HDLC SERPL-MCNC: 70 MG/DL — SIGNIFICANT CHANGE UP (ref 40–125)
HGB BLD-MCNC: 14 G/DL — SIGNIFICANT CHANGE UP (ref 11.5–15.5)
LIPID PNL WITH DIRECT LDL SERPL: 120 MG/DL — SIGNIFICANT CHANGE UP
MCHC RBC-ENTMCNC: 33 PG — SIGNIFICANT CHANGE UP (ref 27–34)
MCHC RBC-ENTMCNC: 35.4 GM/DL — SIGNIFICANT CHANGE UP (ref 32–36)
MCV RBC AUTO: 93.4 FL — SIGNIFICANT CHANGE UP (ref 80–100)
PLATELET # BLD AUTO: 277 K/UL — SIGNIFICANT CHANGE UP (ref 150–400)
POTASSIUM SERPL-MCNC: 4.2 MMOL/L — SIGNIFICANT CHANGE UP (ref 3.5–5.3)
POTASSIUM SERPL-SCNC: 4.2 MMOL/L — SIGNIFICANT CHANGE UP (ref 3.5–5.3)
RBC # BLD: 4.24 M/UL — SIGNIFICANT CHANGE UP (ref 3.8–5.2)
RBC # FLD: 12.5 % — SIGNIFICANT CHANGE UP (ref 10.3–14.5)
SODIUM SERPL-SCNC: 142 MMOL/L — SIGNIFICANT CHANGE UP (ref 135–145)
TOTAL CHOLESTEROL/HDL RATIO MEASUREMENT: 3 RATIO — LOW (ref 3.3–7.1)
TRIGL SERPL-MCNC: 100 MG/DL — SIGNIFICANT CHANGE UP (ref 10–149)
WBC # BLD: 6.55 K/UL — SIGNIFICANT CHANGE UP (ref 3.8–10.5)
WBC # FLD AUTO: 6.55 K/UL — SIGNIFICANT CHANGE UP (ref 3.8–10.5)

## 2018-01-18 PROCEDURE — 99233 SBSQ HOSP IP/OBS HIGH 50: CPT

## 2018-01-18 PROCEDURE — 70551 MRI BRAIN STEM W/O DYE: CPT | Mod: 26

## 2018-01-18 PROCEDURE — 99223 1ST HOSP IP/OBS HIGH 75: CPT | Mod: AI

## 2018-01-18 PROCEDURE — 93306 TTE W/DOPPLER COMPLETE: CPT | Mod: 26

## 2018-01-18 RX ADMIN — CLOPIDOGREL BISULFATE 75 MILLIGRAM(S): 75 TABLET, FILM COATED ORAL at 11:52

## 2018-01-18 RX ADMIN — Medication 5 MILLIGRAM(S): at 15:46

## 2018-01-18 RX ADMIN — ATORVASTATIN CALCIUM 80 MILLIGRAM(S): 80 TABLET, FILM COATED ORAL at 21:10

## 2018-01-18 RX ADMIN — SENNA PLUS 2 TABLET(S): 8.6 TABLET ORAL at 21:10

## 2018-01-18 RX ADMIN — Medication 81 MILLIGRAM(S): at 11:52

## 2018-01-18 RX ADMIN — ENOXAPARIN SODIUM 40 MILLIGRAM(S): 100 INJECTION SUBCUTANEOUS at 06:07

## 2018-01-18 NOTE — PROGRESS NOTE ADULT - SUBJECTIVE AND OBJECTIVE BOX
THE PATIENT WAS SEEN AND EXAMINED BY ME WITH THE HOUSESTAFF AND STROKE TEAM DURING MORNING ROUNDS.   HPI:  Patient is a 72 year old woman w/ no significant PMHx who presented w/ word-finding difficulty and R facial droop since 10PM the night prior to admission.  She reported that she had been having some word-finding difficulties since the previous evening, however she did not follow up on it and went to bed.  She woke up still having the same symptoms as well as a retro-orbital headache.  Her friend noticed on the phone that the patient was not making sense, and told her daughter about it.  The patient was then brought to the ED for her continued symptoms.  Code stroke called after patient examined in ED, NIHSS 3, MRS 0, tPA not administered as patient out of window, and no intervention due to low NIHSS. Symptoms returned and intensified while in CDU, decision was made to admit pt at that time. No recent fevers, chills, dizziness, changes in vision, weakness, numbness, tingling, CP, SOB, cough, nausea/vomiting, abdominal pain, changes in BMs/urination.     SUBJECTIVE: No events overnight.  No new neurologic complaints.      acetaminophen   Tablet. 650 milliGRAM(s) Oral every 6 hours PRN  aspirin  chewable 81 milliGRAM(s) Oral daily  atorvastatin 80 milliGRAM(s) Oral at bedtime  clopidogrel Tablet 75 milliGRAM(s) Oral daily  diazepam    Tablet 5 milliGRAM(s) Oral once  enoxaparin Injectable 40 milliGRAM(s) SubCutaneous every 24 hours  senna 2 Tablet(s) Oral at bedtime  sodium chloride 0.9%. 1000 milliLiter(s) IV Continuous <Continuous>      PHYSICAL EXAM:   Vital Signs Last 24 Hrs  T(C): 36.8 (18 Jan 2018 05:30), Max: 36.8 (17 Jan 2018 15:57)  T(F): 98.2 (18 Jan 2018 05:30), Max: 98.2 (17 Jan 2018 15:57)  HR: 82 (18 Jan 2018 05:30) (75 - 85)  BP: 151/71 (18 Jan 2018 05:30) (138/70 - 158/78)  BP(mean): --  RR: 18 (18 Jan 2018 05:30) (18 - 18)  SpO2: 99% (18 Jan 2018 05:30) (95% - 99%)    General: No acute distress  HEENT: EOM intact, visual fields full  Abdomen: Soft, nontender, nondistended   Extremities: No edema    NEUROLOGICAL EXAM:  Mental status: Awake, alert, and attentive, no aphasia, able to follow one step commands but not across midline, repetition impraired no neglect, normal memory   Cranial Nerves: Mild central facial palsy, no nystagmus, speech mild-mod dysfluent with frequent word finding pauses, occasional word substitutions, dysarthria, no dysphagia, tongue midline, shoulder shrug intact bilaterally.  Motor exam: Normal tone, no drift, 5/5 RUE, 5/5 RLE, 5/5 LUE, 5/5 LLE, normal fine finger movements.  Sensation: Intact to light touch   Coordination/ Gait: No dysmetria, KESHA intact and symmetric bilaterally, on bedrest     LABS:                        13.8   6.8   )-----------( 271      ( 16 Jan 2018 13:03 )             39.4    01-16    141  |  102  |  12  ----------------------------<  107<H>  3.8   |  25  |  0.59    Ca    9.4      16 Jan 2018 13:03    TPro  7.5  /  Alb  4.3  /  TBili  0.4  /  DBili  x   /  AST  22  /  ALT  30  /  AlkPhos  101  01-16  PT/INR - ( 16 Jan 2018 13:03 )   PT: 11.0 sec;   INR: 1.01 ratio         PTT - ( 16 Jan 2018 13:03 )  PTT:27.5 sec  Hemoglobin A1C, Whole Blood: 5.4 % (01-17 @ 07:32)      IMAGING: Reviewed by me.   CT Brain and CT Angio Head and Neck w/ IV Cont (01.16.18 @ 12:52)   IMPRESSION: Mild atrophy and small vessel white matter ischemic changes.   Normal CTA of the head and neck. No large vessel occlusion. No carotid or   vertebral stenosis in the neck using NASCET criteria. THE PATIENT WAS SEEN AND EXAMINED BY ME WITH THE HOUSESTAFF AND STROKE TEAM DURING MORNING ROUNDS.   HPI:  Patient is a 72 year old woman w/ no significant PMHx who presented w/ word-finding difficulty and R facial droop since 10PM the night prior to admission.  She reported that she had been having some word-finding difficulties since the previous evening, however she did not follow up on it and went to bed.  She woke up still having the same symptoms as well as a retro-orbital headache.  Her friend noticed on the phone that the patient was not making sense, and told her daughter about it.  The patient was then brought to the ED for her continued symptoms.  Code stroke called after patient examined in ED, NIHSS 3, MRS 0, tPA not administered as patient out of window, and no intervention due to low NIHSS. Symptoms returned and intensified while in CDU, decision was made to admit pt at that time. No recent fevers, chills, dizziness, changes in vision, weakness, numbness, tingling, CP, SOB, cough, nausea/vomiting, abdominal pain, changes in BMs/urination.     SUBJECTIVE: No events overnight.  No new neurologic complaints.      acetaminophen   Tablet. 650 milliGRAM(s) Oral every 6 hours PRN  aspirin  chewable 81 milliGRAM(s) Oral daily  atorvastatin 80 milliGRAM(s) Oral at bedtime  clopidogrel Tablet 75 milliGRAM(s) Oral daily  diazepam    Tablet 5 milliGRAM(s) Oral once  enoxaparin Injectable 40 milliGRAM(s) SubCutaneous every 24 hours  senna 2 Tablet(s) Oral at bedtime  sodium chloride 0.9%. 1000 milliLiter(s) IV Continuous <Continuous>      PHYSICAL EXAM:   Vital Signs Last 24 Hrs  T(C): 36.8 (18 Jan 2018 05:30), Max: 36.8 (17 Jan 2018 15:57)  T(F): 98.2 (18 Jan 2018 05:30), Max: 98.2 (17 Jan 2018 15:57)  HR: 82 (18 Jan 2018 05:30) (75 - 85)  BP: 151/71 (18 Jan 2018 05:30) (138/70 - 158/78)  BP(mean): --  RR: 18 (18 Jan 2018 05:30) (18 - 18)  SpO2: 99% (18 Jan 2018 05:30) (95% - 99%)    General: No acute distress  HEENT: EOM intact, visual fields full  Abdomen: Soft, nontender, nondistended   Extremities: No edema    NEUROLOGICAL EXAM:  Mental status: Awake, alert, and attentive, transcortical sensory aphasia, able to follow one step commands but not across midline, repetition intact, naming intact no neglect  Cranial Nerves: Mild right facial palsy, no nystagmus, speech mild-mod dysfluent with some sentences devoid of contents and others with full content, no dysarthria,  tongue midline, shoulder shrug intact bilaterally.  Motor exam: Normal tone, no drift, 5/5 RUE, 5/5 RLE, 5/5 LUE, 5/5 LLE, normal fine finger movements.  Sensation: Intact to light touch   Coordination/ Gait: No dysmetria, KESHA intact and symmetric bilaterally, on bedrest     LABS:                        13.8   6.8   )-----------( 271      ( 16 Jan 2018 13:03 )             39.4    01-16    141  |  102  |  12  ----------------------------<  107<H>  3.8   |  25  |  0.59    Ca    9.4      16 Jan 2018 13:03    TPro  7.5  /  Alb  4.3  /  TBili  0.4  /  DBili  x   /  AST  22  /  ALT  30  /  AlkPhos  101  01-16  PT/INR - ( 16 Jan 2018 13:03 )   PT: 11.0 sec;   INR: 1.01 ratio         PTT - ( 16 Jan 2018 13:03 )  PTT:27.5 sec  Hemoglobin A1C, Whole Blood: 5.4 % (01-17 @ 07:32)      IMAGING: Reviewed by me.   CT Brain and CT Angio Head and Neck w/ IV Cont (01.16.18 @ 12:52)   IMPRESSION: Mild atrophy and small vessel white matter ischemic changes.   Normal CTA of the head and neck. No large vessel occlusion. No carotid or   vertebral stenosis in the neck using NASCET criteria. THE PATIENT WAS SEEN AND EXAMINED BY ME WITH THE HOUSESTAFF AND STROKE TEAM DURING MORNING ROUNDS.   HPI:  Patient is a 72 year old woman w/ no significant PMHx who presented w/ word-finding difficulty and R facial droop since 10PM the night prior to admission.  She reported that she had been having some word-finding difficulties since the previous evening, however she did not follow up on it and went to bed.  She woke up still having the same symptoms as well as a retro-orbital headache.  Her friend noticed on the phone that the patient was not making sense, and told her daughter about it.  The patient was then brought to the ED for her continued symptoms.  Code stroke called after patient examined in ED, NIHSS 3, MRS 0, tPA not administered as patient out of window, and no intervention due to low NIHSS. Symptoms returned and intensified while in CDU, decision was made to admit pt at that time. No recent fevers, chills, dizziness, changes in vision, weakness, numbness, tingling, CP, SOB, cough, nausea/vomiting, abdominal pain, changes in BMs/urination.     SUBJECTIVE: No events overnight.  No new neurologic complaints.      acetaminophen   Tablet. 650 milliGRAM(s) Oral every 6 hours PRN  aspirin  chewable 81 milliGRAM(s) Oral daily  atorvastatin 80 milliGRAM(s) Oral at bedtime  clopidogrel Tablet 75 milliGRAM(s) Oral daily  diazepam    Tablet 5 milliGRAM(s) Oral once  enoxaparin Injectable 40 milliGRAM(s) SubCutaneous every 24 hours  senna 2 Tablet(s) Oral at bedtime  sodium chloride 0.9%. 1000 milliLiter(s) IV Continuous <Continuous>      PHYSICAL EXAM:   Vital Signs Last 24 Hrs  T(C): 36.8 (18 Jan 2018 05:30), Max: 36.8 (17 Jan 2018 15:57)  T(F): 98.2 (18 Jan 2018 05:30), Max: 98.2 (17 Jan 2018 15:57)  HR: 82 (18 Jan 2018 05:30) (75 - 85)  BP: 151/71 (18 Jan 2018 05:30) (138/70 - 158/78)  BP(mean): --  RR: 18 (18 Jan 2018 05:30) (18 - 18)  SpO2: 99% (18 Jan 2018 05:30) (95% - 99%)    General: No acute distress  HEENT: EOM intact, visual fields full  Abdomen: Soft, nontender, nondistended   Extremities: No edema    NEUROLOGICAL EXAM:  Mental status: Awake, alert, and attentive, speech fluent, able to follow one step commands but not across midline, repetition intact, naming intact no neglect  Cranial Nerves: Mild right facial palsy, no nystagmus, speech mild-mod dysfluent with some sentences devoid of contents and others with full content, no dysarthria,  tongue midline, shoulder shrug intact bilaterally.  Motor exam: Normal tone, no drift, 5/5 RUE, 5/5 RLE, 5/5 LUE, 5/5 LLE, normal fine finger movements.  Sensation: Intact to light touch   Coordination/ Gait: No dysmetria, KESHA intact and symmetric bilaterally, on bedrest     LABS:                        13.8   6.8   )-----------( 271      ( 16 Jan 2018 13:03 )             39.4    01-16    141  |  102  |  12  ----------------------------<  107<H>  3.8   |  25  |  0.59    Ca    9.4      16 Jan 2018 13:03    TPro  7.5  /  Alb  4.3  /  TBili  0.4  /  DBili  x   /  AST  22  /  ALT  30  /  AlkPhos  101  01-16  PT/INR - ( 16 Jan 2018 13:03 )   PT: 11.0 sec;   INR: 1.01 ratio         PTT - ( 16 Jan 2018 13:03 )  PTT:27.5 sec  Hemoglobin A1C, Whole Blood: 5.4 % (01-17 @ 07:32)      IMAGING: Reviewed by me.   CT Brain and CT Angio Head and Neck w/ IV Cont (01.16.18 @ 12:52)   IMPRESSION: Mild atrophy and small vessel white matter ischemic changes.   Normal CTA of the head and neck. No large vessel occlusion. No carotid or   vertebral stenosis in the neck using NASCET criteria.

## 2018-01-18 NOTE — PHYSICAL THERAPY INITIAL EVALUATION ADULT - DISCHARGE DISPOSITION, PT EVAL
rehabilitation facility/Acute Rehabilitation Facility rehabilitation facility/Acute TBI Rehabilitation Facility

## 2018-01-18 NOTE — CONSULT NOTE ADULT - SUBJECTIVE AND OBJECTIVE BOX
HPI:  72 y.o female w/ no significant PMHx presenting w/ word-finding difficulty and R facial droop since 10PM the night before admission.  She reports that she had been having some word-finding difficulties since the night of admission, however she did not follow up on it and went to bed.  She woke up still having the same symptoms as well as a retro-orbital headache.  Her friend noticed on the phone that the patient was not making sense, and told her daughter about it.  The patient was then brought to the ED for her continued symptoms.  Code stroke called after patient examined in ED, NIHSS 3, MRS 0, TPA not administered as patient out of window, and no intervention due to low NIHSS. Symptoms returned and intensified while in CDU, decision was made to admit pt at that time.    PAST MEDICAL & SURGICAL HISTORY:  No pertinent past medical history  H/O total hysterectomy  History of appendectomy      ROS:    General: Pt denies recent weight loss/fever/chills    Neurological: denies numbness or  sensation loss    HEENT: denies visual changes, no hearing loss, denies sore throat    Cardiovascular: denies chest pain/palpitations/leg edema    Respiratory and Thorax: denies SOB/cough/wheezing    Gastrointestinal: denies abdominal pain/diarrhea/constipation/bloody stool    Genitourinary: denies urinary frequency/urgency/ dysuria    Musculoskeletal: denies joint pain or swelling, denies restricted motion    Skin: denies rashes/sores    Endocrine: denies heat or cold intolerance/excessive thirst    Hematologic: denies abnormal bleeding  	    MEDICATIONS  (STANDING):  aspirin  chewable 81 milliGRAM(s) Oral daily  atorvastatin 80 milliGRAM(s) Oral at bedtime  clopidogrel Tablet 75 milliGRAM(s) Oral daily  enoxaparin Injectable 40 milliGRAM(s) SubCutaneous every 24 hours  senna 2 Tablet(s) Oral at bedtime  sodium chloride 0.9%. 1000 milliLiter(s) (60 mL/Hr) IV Continuous <Continuous>    MEDICATIONS  (PRN):  acetaminophen   Tablet. 650 milliGRAM(s) Oral every 6 hours PRN Mild Pain (1 - 3)      Allergies    penicillin (Rash)      SOCIAL HISTORY:    FAMILY HISTORY:  No pertinent family history in first degree relatives      Vital Signs Last 24 Hrs  T(C): 36.6 (2018 15:44), Max: 36.8 (2018 23:44)  T(F): 97.9 (2018 15:44), Max: 98.2 (2018 23:44)  HR: 74 (2018 15:44) (74 - 85)  BP: 151/76 (2018 15:44) (138/72 - 158/78)  RR: 18 (2018 15:44) (18 - 18)  SpO2: 96% (2018 15:44) (96% - 99%)    Physical Exam:  Constitutional: well developed, well nourished, no deformities and no acute distress    Neurological: Alert & Oriented x 3, CHAPPELL    HEENT: NC/AT, PERRLA, EOMI,  Neck supple.    Respiratory: CTA B/L, No wheezing/crackles/rhonchi    Cardiovascular: (+) S1 & S2, RRR, No m/r/g    Gastrointestinal: soft, NT, nondistended, (+) BS    Genitourinary: non distended bladder, voiding freely    Extremities: No pedal edema, No clubbing, No cyanosis    Skin:  normal skin color and pigmentation, no skin lesions      LABS:                        14.0   6.55  )-----------( 277      ( 2018 08:10 )             39.6     01-18    142  |  104  |  13  ----------------------------<  97  4.2   |  23  |  0.49<L>    Ca    9.5      2018 07:50        Urinalysis Basic - ( 2018 09:27 )    Color: Yellow / Appearance: Clear / S.019 / pH: x  Gluc: x / Ketone: Small  / Bili: Negative / Urobili: Negative   Blood: x / Protein: Negative / Nitrite: Negative   Leuk Esterase: Trace / RBC: x / WBC 2-5 /HPF   Sq Epi: x / Non Sq Epi: Occasional /HPF / Bacteria: x      RADIOLOGY & ADDITIONAL STUDIES:    EKG: SR at rate 87 bpm

## 2018-01-18 NOTE — PHYSICAL THERAPY INITIAL EVALUATION ADULT - RANGE OF MOTION EXAMINATION, REHAB EVAL
BUE/ BLE grossly 3+/5 throughout/bilateral lower extremity ROM was WFL (within functional limits)/bilateral upper extremity ROM was WFL (within functional limits)

## 2018-01-18 NOTE — PHYSICAL THERAPY INITIAL EVALUATION ADULT - PERTINENT HX OF CURRENT PROBLEM, REHAB EVAL
73 yo F presents w/ word-finding difficulty and R facial droop.  Code stroke called after patient examined in ED, NIHSS 3, MRS 0, tPA not administered as patient out of window, Normal CTA of the head and neck. No large vessel occlusion.  Likely small Broca's Area stroke.  Lower extremity venous Doppler (1/16/18) was negative for DVT.

## 2018-01-18 NOTE — CONSULT NOTE ADULT - PROBLEM SELECTOR RECOMMENDATION 9
- plan for ILR tomorrow to evaluate for any arrhythmia as etiology of stroke  - EP will follow     ISIDORO Guadarrama [facility]-BC # [id number]

## 2018-01-18 NOTE — PROGRESS NOTE ADULT - ASSESSMENT
ASSESSMENT: This is a 73 yo woman with no significant PMH who presented with sudden speech difficulty including word finding difficulty as well as a headache.    Impression On 1/15/18 she developed mild-mod mixed aphasia and subtle right hemiparesis.  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    NEURO: Continue close monitoring for neurologic deterioration, permissive HTN, titrate statin to LDL goal less than 70, MRI Brain w/o, MRA Head w/o and Neck w/contrast. Physical therapy/OT/Speech eval/treatment.     ANTITHROMBOTIC THERAPY: ASA and Plavix for 3 weeks, per Chance trial    PULMONARY: CXR clear, protecting airway, saturating well     CARDIOVASCULAR: pending TTE, cardiac monitoring without events                              SBP goal: normotension    GASTROINTESTINAL:  dysphagia screen  passed     Diet: Regular DASH    RENAL: BUN/Cr without acute change, good urine output      Na Goal: Greater than 135     Kapoor: No    HEMATOLOGY: H/H without acute change, Platelets 271      DVT ppx: Heparin s.c [] LMWH [x]     ID: afebrile, no leukocytosis     OTHER:     DISPOSITION: Rehab or home depending on PT eval once stable and workup is complete    CORE MEASURES:        Admission NIHSS: 3     TPA: [] YES [x] NO      LDL/HDL: 102/72     Depression Screen: p     Statin Therapy: yes     Dysphagia Screen: [x] PASS [] FAIL     Smoking [] YES [x] NO      Afib [] YES [x] NO     Stroke Education [] YES [] NO ASSESSMENT: This is a 73 yo woman with no significant PMH who presented with sudden speech difficulty including word finding difficulty as well as a headache.    Impression: On 1/15/18 she developed mild-mod mixed aphasia and subtle right hemiparesis.  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    NEURO:  Neurologically improved vs admission, continue close monitoring for neurologic deterioration, permissive HTN, titrate statin to LDL goal less than 70, MRI Brain w/o, MRA Head w/o and Neck w/contras pending today with sedation. Physical therapy with recommendation to Acute TBI.    ANTITHROMBOTIC THERAPY: ASA and Plavix for 3 weeks, per Chance trial    PULMONARY: CXR clear, protecting airway, saturating well      CARDIOVASCULAR:1/ TTE-hyperdynamic LV systolic function, mild diastolic dysfunction, stage 1, no PFO, EPS called for placement of ICM, cardiac monitoring without events                              SBP goal: normotension    GASTROINTESTINAL:  dysphagia screen  passed     Diet: Regular DASH    RENAL: BUN/Cr without acute change, good urine output      Na Goal: Greater than 135     Kapoor: No    HEMATOLOGY: H/H without acute change, Platelets 271      DVT ppx: Heparin s.c [] LMWH [x]     ID: afebrile, no leukocytosis     DISPOSITION: Rehab or home depending on PT eval once stable and workup is complete    CORE MEASURES:        Admission NIHSS: 3     TPA: [] YES [x] NO      LDL/HDL: 102/72     Depression Screen: na. unable to determine due to aphasia at this time.     Statin Therapy: yes     Dysphagia Screen: [x] PASS [] FAIL     Smoking [] YES [x] NO      Afib [] YES [x] NO     Stroke Education [] YES [] NO pending ASSESSMENT: This is a 71 yo woman with no significant PMH who presented with sudden speech difficulty including word finding difficulty as well as a headache.    Impression: On 1/15/18 she developed mild-mod mixed aphasia and subtle right hemiparesis.  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    NEURO:  Neurologically improved vs admission, now with transcortical sensory aphasia continue close monitoring for neurologic deterioration, permissive HTN, titrate statin to LDL goal less than 70, MRI Brain w/o, MRA Head w/o and Neck w/contras pending today with sedation. Physical therapy with recommendation to Acute TBI.    ANTITHROMBOTIC THERAPY: ASA and Plavix for 3 weeks, per Chance trial    PULMONARY: CXR clear, protecting airway, saturating well      CARDIOVASCULAR:1/ TTE-hyperdynamic LV systolic function, mild diastolic dysfunction, stage 1, no PFO, EPS called for placement of ICM, cardiac monitoring without events                              SBP goal: normotension    GASTROINTESTINAL:  dysphagia screen  passed     Diet: Regular DASH    RENAL: BUN/Cr without acute change, good urine output      Na Goal: Greater than 135     Kapoor: No    HEMATOLOGY: H/H without acute change, Platelets 271      DVT ppx: Heparin s.c [] LMWH [x]     ID: afebrile, no leukocytosis     DISPOSITION: Rehab or home depending on PT eval once stable and workup is complete    CORE MEASURES:        Admission NIHSS: 3     TPA: [] YES [x] NO      LDL/HDL: 102/72     Depression Screen: na. unable to determine due to aphasia at this time.     Statin Therapy: yes     Dysphagia Screen: [x] PASS [] FAIL     Smoking [] YES [x] NO      Afib [] YES [x] NO     Stroke Education [] YES [] NO pending

## 2018-01-18 NOTE — PHYSICAL THERAPY INITIAL EVALUATION ADULT - ADDITIONAL COMMENTS
pt lives in 4th floor apartment w/ elevator.  Pt is a community ambulator and is able to drive a car.

## 2018-01-18 NOTE — CONSULT NOTE ADULT - CONSULT REASON
to evaluate for loop recorder implantation in setting of recent stroke symptoms to evaluate for loop recorder implantation in setting of stroke.

## 2018-01-18 NOTE — CONSULT NOTE ADULT - ASSESSMENT
This is a 72 y.o female w/ no significant PMHx presenting w/ word-finding difficulty and R facial droop This is a 72 y.o female w/ no significant PMHx presenting w/ word-finding difficulty and R facial droop. EP consult requested for evaluation of ILR.

## 2018-01-19 PROCEDURE — 33282: CPT

## 2018-01-19 PROCEDURE — 95819 EEG AWAKE AND ASLEEP: CPT | Mod: 26

## 2018-01-19 PROCEDURE — 99233 SBSQ HOSP IP/OBS HIGH 50: CPT

## 2018-01-19 PROCEDURE — 99222 1ST HOSP IP/OBS MODERATE 55: CPT

## 2018-01-19 RX ORDER — ZOLPIDEM TARTRATE 10 MG/1
5 TABLET ORAL AT BEDTIME
Qty: 0 | Refills: 0 | Status: DISCONTINUED | OUTPATIENT
Start: 2018-01-19 | End: 2018-01-23

## 2018-01-19 RX ADMIN — ENOXAPARIN SODIUM 40 MILLIGRAM(S): 100 INJECTION SUBCUTANEOUS at 05:24

## 2018-01-19 RX ADMIN — ATORVASTATIN CALCIUM 80 MILLIGRAM(S): 80 TABLET, FILM COATED ORAL at 22:02

## 2018-01-19 RX ADMIN — CLOPIDOGREL BISULFATE 75 MILLIGRAM(S): 75 TABLET, FILM COATED ORAL at 11:24

## 2018-01-19 RX ADMIN — Medication 81 MILLIGRAM(S): at 11:24

## 2018-01-19 RX ADMIN — SENNA PLUS 2 TABLET(S): 8.6 TABLET ORAL at 22:02

## 2018-01-19 NOTE — OCCUPATIONAL THERAPY INITIAL EVALUATION ADULT - LEVEL OF INDEPENDENCE: GROOMING, OT EVAL
supervision/Patient brushed teeth and washed hands at edge of sink. Patient brushed teeth and washed hands at edge of sink./contact guard

## 2018-01-19 NOTE — OCCUPATIONAL THERAPY INITIAL EVALUATION ADULT - PERSONAL SAFETY AND JUDGMENT, REHAB EVAL
Patient favors leaning on L side./impaired impaired/Patient favors leaning towards L side. impaired/Patient favors leaning towards L side with noted STM deficits. Patient favors leaning towards L side./impaired

## 2018-01-19 NOTE — PROGRESS NOTE ADULT - SUBJECTIVE AND OBJECTIVE BOX
THE PATIENT WAS SEEN AND EXAMINED BY ME WITH THE HOUSESTAFF AND STROKE TEAM DURING MORNING ROUNDS.   HPI:  Patient is a 72 year old woman w/ no significant PMHx who presented w/ word-finding difficulty and R facial droop since 10PM the night prior to admission.  She reported that she had been having some word-finding difficulties since the previous evening, however she did not follow up on it and went to bed.  She woke up still having the same symptoms as well as a retro-orbital headache.  Her friend noticed on the phone that the patient was not making sense, and told her daughter about it.  The patient was then brought to the ED for her continued symptoms.  Code stroke called after patient examined in ED, NIHSS 3, MRS 0, tPA not administered as patient out of window, and no intervention due to low NIHSS. Symptoms returned and intensified while in CDU, decision was made to admit pt at that time. No recent fevers, chills, dizziness, changes in vision, weakness, numbness, tingling, CP, SOB, cough, nausea/vomiting, abdominal pain, changes in BMs/urination      SUBJECTIVE: No events overnight.  No new neurologic complaints.      acetaminophen   Tablet. 650 milliGRAM(s) Oral every 6 hours PRN  aspirin  chewable 81 milliGRAM(s) Oral daily  atorvastatin 80 milliGRAM(s) Oral at bedtime  clopidogrel Tablet 75 milliGRAM(s) Oral daily  enoxaparin Injectable 40 milliGRAM(s) SubCutaneous every 24 hours  senna 2 Tablet(s) Oral at bedtime  sodium chloride 0.9%. 1000 milliLiter(s) IV Continuous <Continuous>      PHYSICAL EXAM:   Vital Signs Last 24 Hrs  T(C): 36.8 (19 Jan 2018 05:25), Max: 36.8 (19 Jan 2018 05:25)  T(F): 98.3 (19 Jan 2018 05:25), Max: 98.3 (19 Jan 2018 05:25)  HR: 88 (19 Jan 2018 05:25) (74 - 88)  BP: 130/71 (19 Jan 2018 05:25) (130/71 - 156/82)  BP(mean): --  RR: 18 (19 Jan 2018 05:25) (18 - 18)  SpO2: 99% (19 Jan 2018 05:25) (96% - 99%)    General: No acute distress  HEENT: EOM intact, visual fields full  Abdomen: Soft, nontender, nondistended   Extremities: No edema    NEUROLOGICAL EXAM:  Mental status: Awake, alert, and attentive, speech fluent, able to follow one step commands but not across midline, repetition intact, naming intact no neglect  Cranial Nerves: Mild right facial palsy, no nystagmus, speech mild-mod dysfluent with some sentences devoid of contents and others with full content, no dysarthria,  tongue midline, shoulder shrug intact bilaterally.  Motor exam: Normal tone, no drift, 5/5 RUE, 5/5 RLE, 5/5 LUE, 5/5 LLE, normal fine finger movements.  Sensation: Intact to light touch   Coordination/ Gait: No dysmetria, KESHA intact and symmetric bilaterally, on bedrest       LABS:                        14.0   6.55  )-----------( 277      ( 18 Jan 2018 08:10 )             39.6    01-18    142  |  104  |  13  ----------------------------<  97  4.2   |  23  |  0.49<L>    Ca    9.5      18 Jan 2018 07:50      Hemoglobin A1C, Whole Blood: 5.4 % (01-17 @ 07:32)      IMAGING: Reviewed by me.   CT Brain and CT Angio Head and Neck w/ IV Cont (01.16.18 @ 12:52)   IMPRESSION: Mild atrophy and small vessel white matter ischemic changes.   Normal CTA of the head and neck. No large vessel occlusion. No carotid or   vertebral stenosis in the neck using NASCET criteria. THE PATIENT WAS SEEN AND EXAMINED BY ME WITH THE HOUSESTAFF AND STROKE TEAM DURING MORNING ROUNDS.   HPI:  Patient is a 72 year old woman w/ no significant PMHx who presented w/ word-finding difficulty and R facial droop since 10PM the night prior to admission.  She reported that she had been having some word-finding difficulties since the previous evening, however she did not follow up on it and went to bed.  She woke up still having the same symptoms as well as a retro-orbital headache.  Her friend noticed on the phone that the patient was not making sense, and told her daughter about it.  The patient was then brought to the ED for her continued symptoms.  Code stroke called after patient examined in ED, NIHSS 3, MRS 0, tPA not administered as patient out of window, and no intervention due to low NIHSS. Symptoms returned and intensified while in CDU, decision was made to admit pt at that time. No recent fevers, chills, dizziness, changes in vision, weakness, numbness, tingling, CP, SOB, cough, nausea/vomiting, abdominal pain, changes in BMs/urination      SUBJECTIVE: No events overnight.  No new neurologic complaints.  "Tired but okay" Family states she is doing much better with her speech and conversation.    acetaminophen   Tablet. 650 milliGRAM(s) Oral every 6 hours PRN  aspirin  chewable 81 milliGRAM(s) Oral daily  atorvastatin 80 milliGRAM(s) Oral at bedtime  clopidogrel Tablet 75 milliGRAM(s) Oral daily  enoxaparin Injectable 40 milliGRAM(s) SubCutaneous every 24 hours  senna 2 Tablet(s) Oral at bedtime  sodium chloride 0.9%. 1000 milliLiter(s) IV Continuous <Continuous>      PHYSICAL EXAM:   Vital Signs Last 24 Hrs  T(C): 36.8 (19 Jan 2018 05:25), Max: 36.8 (19 Jan 2018 05:25)  T(F): 98.3 (19 Jan 2018 05:25), Max: 98.3 (19 Jan 2018 05:25)  HR: 88 (19 Jan 2018 05:25) (74 - 88)  BP: 130/71 (19 Jan 2018 05:25) (130/71 - 156/82)  BP(mean): --  RR: 18 (19 Jan 2018 05:25) (18 - 18)  SpO2: 99% (19 Jan 2018 05:25) (96% - 99%)    General: No acute distress  HEENT: EOM intact, visual fields full  Abdomen: Soft, nontender, nondistended   Extremities: No edema    NEUROLOGICAL EXAM:  Mental status: Awake, alert, and attentive,  able to follow all commands, repetition intact, naming intact, no neglect  Cranial Nerves: Right naso-labial flattening, no nystagmus, speech fluent, no dysarthria,  tongue midline, shoulder shrug intact bilaterally.  Motor exam: Normal tone, no drift, 5/5 RUE, 5/5 RLE, 5/5 LUE, 5/5 LLE, normal fine finger movements.  Sensation: Intact to light touch   Coordination/ Gait: No dysmetria, gait not assessed       LABS:                        14.0   6.55  )-----------( 277      ( 18 Jan 2018 08:10 )             39.6    01-18    142  |  104  |  13  ----------------------------<  97  4.2   |  23  |  0.49<L>    Ca    9.5      18 Jan 2018 07:50      Hemoglobin A1C, Whole Blood: 5.4 % (01-17 @ 07:32)      IMAGING: Reviewed by me.   CT Brain and CT Angio Head and Neck w/ IV Cont (01.16.18 @ 12:52)   IMPRESSION: Mild atrophy and small vessel white matter ischemic changes.   Normal CTA of the head and neck. No large vessel occlusion. No carotid or   vertebral stenosis in the neck using NASCET criteria. THE PATIENT WAS SEEN AND EXAMINED BY ME WITH THE HOUSESTAFF AND STROKE TEAM DURING MORNING ROUNDS.   HPI:  Patient is a 72 year old woman w/ no significant PMHx who presented w/ word-finding difficulty and R facial droop since 10PM the night prior to admission.  She reported that she had been having some word-finding difficulties since the previous evening, however she did not follow up on it and went to bed.  She woke up still having the same symptoms as well as a retro-orbital headache.  Her friend noticed on the phone that the patient was not making sense, and told her daughter about it.  The patient was then brought to the ED for her continued symptoms.  Code stroke called after patient examined in ED, NIHSS 3, MRS 0, tPA not administered as patient out of window, and no intervention due to low NIHSS. Symptoms returned and intensified while in CDU, decision was made to admit pt at that time. No recent fevers, chills, dizziness, changes in vision, weakness, numbness, tingling, CP, SOB, cough, nausea/vomiting, abdominal pain, changes in BMs/urination      SUBJECTIVE: No events overnight.  No new neurologic complaints.  "Tired but okay" Family states she is doing much better with her speech and conversation.    acetaminophen   Tablet. 650 milliGRAM(s) Oral every 6 hours PRN  aspirin  chewable 81 milliGRAM(s) Oral daily  atorvastatin 80 milliGRAM(s) Oral at bedtime  clopidogrel Tablet 75 milliGRAM(s) Oral daily  enoxaparin Injectable 40 milliGRAM(s) SubCutaneous every 24 hours  senna 2 Tablet(s) Oral at bedtime  sodium chloride 0.9%. 1000 milliLiter(s) IV Continuous <Continuous>      PHYSICAL EXAM:   Vital Signs Last 24 Hrs  T(C): 36.8 (19 Jan 2018 05:25), Max: 36.8 (19 Jan 2018 05:25)  T(F): 98.3 (19 Jan 2018 05:25), Max: 98.3 (19 Jan 2018 05:25)  HR: 88 (19 Jan 2018 05:25) (74 - 88)  BP: 130/71 (19 Jan 2018 05:25) (130/71 - 156/82)  BP(mean): --  RR: 18 (19 Jan 2018 05:25) (18 - 18)  SpO2: 99% (19 Jan 2018 05:25) (96% - 99%)    General: No acute distress  HEENT: EOM intact, visual fields full  Abdomen: Soft, nontender, nondistended   Extremities: No edema    NEUROLOGICAL EXAM:  Mental status: Awake, alert, and attentive,  able to follow all commands, repetition intact, naming intact, no neglect  Cranial Nerves: Right naso-labial flattening, no nystagmus, speech fluent, no dysarthria,  tongue midline, shoulder shrug intact bilaterally.  Motor exam: Normal tone, no drift, 5/5 RUE, 5/5 RLE, 5/5 LUE, 5/5 LLE, normal fine finger movements.  Sensation: Intact to light touch   Coordination/ Gait: No dysmetria, gait not assessed     LABS:                        14.0   6.55  )-----------( 277      ( 18 Jan 2018 08:10 )             39.6    01-18    142  |  104  |  13  ----------------------------<  97  4.2   |  23  |  0.49<L>    Ca    9.5      18 Jan 2018 07:50      Hemoglobin A1C, Whole Blood: 5.4 % (01-17 @ 07:32)      IMAGING: Reviewed by me.   CT Brain and CT Angio Head and Neck w/ IV Cont (01.16.18 @ 12:52)   IMPRESSION: Mild atrophy and small vessel white matter ischemic changes.   Normal CTA of the head and neck. No large vessel occlusion. No carotid or   vertebral stenosis in the neck using NASCET criteria.    < from: MR Head No Cont (01.18.18 @ 17:30) >  IMPRESSION: Mild involutional change and microvascular ischemic type   changes. No acute infarct. No evidence of intracranial hemorrhage. Study   is somewhat motion limited as patient could n THE PATIENT WAS SEEN AND EXAMINED BY ME WITH THE HOUSESTAFF AND STROKE TEAM DURING MORNING ROUNDS.   HPI:  Patient is a 72 year old woman w/ no significant PMHx who presented w/ word-finding difficulty and R facial droop since 10PM the night prior to admission.  She reported that she had been having some word-finding difficulties since the previous evening, however she did not follow up on it and went to bed.  She woke up still having the same symptoms as well as a retro-orbital headache.  Her friend noticed on the phone that the patient was not making sense, and told her daughter about it.  The patient was then brought to the ED for her continued symptoms.  Code stroke called after patient examined in ED, NIHSS 3, MRS 0, tPA not administered as patient out of window, and no intervention due to low NIHSS. Symptoms returned and intensified while in CDU, decision was made to admit pt at that time. No recent fevers, chills, dizziness, changes in vision, weakness, numbness, tingling, CP, SOB, cough, nausea/vomiting, abdominal pain, changes in BMs/urination      SUBJECTIVE: No events overnight.  No new neurologic complaints.  "Tired but okay" Family states she is doing much better with her speech and conversation.    acetaminophen   Tablet. 650 milliGRAM(s) Oral every 6 hours PRN  aspirin  chewable 81 milliGRAM(s) Oral daily  atorvastatin 80 milliGRAM(s) Oral at bedtime  clopidogrel Tablet 75 milliGRAM(s) Oral daily  enoxaparin Injectable 40 milliGRAM(s) SubCutaneous every 24 hours  senna 2 Tablet(s) Oral at bedtime  sodium chloride 0.9%. 1000 milliLiter(s) IV Continuous <Continuous>      PHYSICAL EXAM:   Vital Signs Last 24 Hrs  T(C): 36.8 (19 Jan 2018 05:25), Max: 36.8 (19 Jan 2018 05:25)  T(F): 98.3 (19 Jan 2018 05:25), Max: 98.3 (19 Jan 2018 05:25)  HR: 88 (19 Jan 2018 05:25) (74 - 88)  BP: 130/71 (19 Jan 2018 05:25) (130/71 - 156/82)  BP(mean): --  RR: 18 (19 Jan 2018 05:25) (18 - 18)  SpO2: 99% (19 Jan 2018 05:25) (96% - 99%)    General: No acute distress  HEENT: EOM intact, visual fields full  Abdomen: Soft, nontender, nondistended   Extremities: No edema    NEUROLOGICAL EXAM:  Mental status: Awake, alert, and attentive, speech fluent and prosodic; able to follow all commands, repetition intact, naming intact, no neglect  Cranial Nerves: Right naso-labial flattening, no nystagmus, speech fluent, no dysarthria,  tongue midline, shoulder shrug intact bilaterally.  Motor exam: Normal tone, no drift, 5/5 RUE, 5/5 RLE, 5/5 LUE, 5/5 LLE, normal fine finger movements.  Sensation: Intact to light touch   Coordination/ Gait: No dysmetria, gait not assessed     LABS:                        14.0   6.55  )-----------( 277      ( 18 Jan 2018 08:10 )             39.6    01-18    142  |  104  |  13  ----------------------------<  97  4.2   |  23  |  0.49<L>    Ca    9.5      18 Jan 2018 07:50      Hemoglobin A1C, Whole Blood: 5.4 % (01-17 @ 07:32)      IMAGING: Reviewed by me.   CT Brain and CT Angio Head and Neck w/ IV Cont (01.16.18 @ 12:52)   IMPRESSION: Mild atrophy and small vessel white matter ischemic changes.   Normal CTA of the head and neck. No large vessel occlusion. No carotid or   vertebral stenosis in the neck using NASCET criteria.    < from: MR Head No Cont (01.18.18 @ 17:30) >  IMPRESSION: Mild involutional change and microvascular ischemic type   changes. No acute infarct. No evidence of intracranial hemorrhage. Study   is somewhat motion limited as patient could n

## 2018-01-19 NOTE — OCCUPATIONAL THERAPY INITIAL EVALUATION ADULT - PLANNED THERAPY INTERVENTIONS, OT EVAL
cognitive, visual perceptual/neuromuscular re-education/ADL retraining/bed mobility training/strengthening/fine motor coordination training/transfer training

## 2018-01-19 NOTE — OCCUPATIONAL THERAPY INITIAL EVALUATION ADULT - DIAGNOSIS, OT EVAL
Decreased cognition, coordination, and balance affecting ability to perform ADLs and functional mobility.

## 2018-01-19 NOTE — OCCUPATIONAL THERAPY INITIAL EVALUATION ADULT - HOME MANAGEMENT SKILLS, PREVIOUS LEVEL OF FUNCTION, OT EVAL
No signs of meconium exposure, Normal patterns of skin texture, integrity, pigmentation, color, vascularity, and perfusion; No rashes or eruptions.
independent

## 2018-01-19 NOTE — PROGRESS NOTE ADULT - SUBJECTIVE AND OBJECTIVE BOX
Pre-op Diagnosis: Stroke    Post-op Diagnosis: stroke    Procedure: ILR    Electrophysiologist: Maame    Assistant: Freddie    Anesthesia: Local    Complications: None    EBL: < 10cc    Plan: local wound care

## 2018-01-19 NOTE — CONSULT NOTE ADULT - SUBJECTIVE AND OBJECTIVE BOX
Cc: Difficulty speaking.     HPI:  Patient is a 72 year old female w/ no significant PMHx presenting w/ word-finding difficulty and R facial droop Her friend noticed on the phone that the patient was not making sense, and told her daughter about it.  The patient was then brought to the ED for her continued symptoms.      REVIEW OF SYSTEMS: No chest pain, shortness of breath, nausea, vomiting or diarhea.      PAST MEDICAL & SURGICAL HISTORY  No pertinent past medical history  H/O total hysterectomy  History of appendectomy  No significant past surgical history      SOCIAL HISTORY  Smoking - Denied, EtOH - Denied, Drugs - Denied    FUNCTIONAL HISTORY:   Lives   Independent    CURRENT FUNCTIONAL STATUS:      FAMILY HISTORY   No pertinent family history in first degree relatives      RECENT LABS/IMAGING  CBC Full  -  ( 18 Jan 2018 08:10 )  WBC Count : 6.55 K/uL  Hemoglobin : 14.0 g/dL  Hematocrit : 39.6 %  Platelet Count - Automated : 277 K/uL  Mean Cell Volume : 93.4 fl  Mean Cell Hemoglobin : 33.0 pg  Mean Cell Hemoglobin Concentration : 35.4 gm/dL  Auto Neutrophil # : x  Auto Lymphocyte # : x  Auto Monocyte # : x  Auto Eosinophil # : x  Auto Basophil # : x  Auto Neutrophil % : x  Auto Lymphocyte % : x  Auto Monocyte % : x  Auto Eosinophil % : x  Auto Basophil % : x    01-18    142  |  104  |  13  ----------------------------<  97  4.2   |  23  |  0.49<L>    Ca    9.5      18 Jan 2018 07:50          VITALS  T(C): 36.8 (01-19-18 @ 05:25), Max: 36.8 (01-19-18 @ 05:25)  HR: 79 (01-19-18 @ 11:04) (74 - 88)  BP: 147/73 (01-19-18 @ 11:04) (130/71 - 156/82)  RR: 18 (01-19-18 @ 05:25) (18 - 18)  SpO2: 96% (01-19-18 @ 11:04) (96% - 99%)  Wt(kg): --    ALLERGIES  penicillin (Rash)      MEDICATIONS   acetaminophen   Tablet. 650 milliGRAM(s) Oral every 6 hours PRN  aspirin  chewable 81 milliGRAM(s) Oral daily  atorvastatin 80 milliGRAM(s) Oral at bedtime  clopidogrel Tablet 75 milliGRAM(s) Oral daily  enoxaparin Injectable 40 milliGRAM(s) SubCutaneous every 24 hours  senna 2 Tablet(s) Oral at bedtime  sodium chloride 0.9%. 1000 milliLiter(s) IV Continuous <Continuous>      ----------------------------------------------------------------------------------------  PHYSICAL EXAM  Constitutional - NAD, Comfortable  HEENT - NCAT, EOMI  Neck - Supple, No limited ROM  Chest - CTA bilaterally, No wheeze, No rhonchi, No crackles  Cardiovascular - RRR, S1S2, No murmurs  Abdomen - BS+, Soft, NTND  Extremities - No C/C/E, No calf tenderness   Neurologic Exam -                    Cognitive - Awake, Alert, AAO to self, place, date, year, situation     Communication - Fluent, No dysarthria, no aphasia     Cranial Nerves - CN 2-12 intact     Motor - No focal deficits                       Sensory - Intact to LT     Reflexes - DTR Intact, No primitive reflexive     Balance - WNL Static  Psychiatric - Mood stable, Affect WNL      Impression:    yo with CVA with      Plan:  PT- ROM, Bed Mob, Transfers, Amb w AD and bracing as needed  OT- ADLs, bracing  SLP- Dysphagia eval and treat  Prec- Falls, Cardiac  DVT Prophylaxis  Skin- Turn q2 h  Dispo- Cc: Difficulty speaking.     HPI:  Patient is a 72 year old female w/ no significant PMHx presenting w/ word-finding difficulty and R facial droop Her friend noticed on the phone that the patient was not making sense, and told her daughter about it.  The patient was then brought to the ED for her continued symptoms.  MRI w microvascular changes- no acute stroke.     REVIEW OF SYSTEMS:  No chest pain, shortness of breath, nausea, vomiting or diarhea.      PAST MEDICAL & SURGICAL HISTORY  No pertinent past medical history  H/O total hysterectomy  History of appendectomy  No significant past surgical history      SOCIAL HISTORY  Smoking - Denied, EtOH - Denied, Drugs - Denied    FUNCTIONAL HISTORY:   Lives   Independent    CURRENT FUNCTIONAL STATUS:      FAMILY HISTORY   No pertinent family history in first degree relatives      RECENT LABS/IMAGING  CBC Full  -  ( 18 Jan 2018 08:10 )  WBC Count : 6.55 K/uL  Hemoglobin : 14.0 g/dL  Hematocrit : 39.6 %  Platelet Count - Automated : 277 K/uL  Mean Cell Volume : 93.4 fl  Mean Cell Hemoglobin : 33.0 pg  Mean Cell Hemoglobin Concentration : 35.4 gm/dL  Auto Neutrophil # : x  Auto Lymphocyte # : x  Auto Monocyte # : x  Auto Eosinophil # : x  Auto Basophil # : x  Auto Neutrophil % : x  Auto Lymphocyte % : x  Auto Monocyte % : x  Auto Eosinophil % : x  Auto Basophil % : x    01-18    142  |  104  |  13  ----------------------------<  97  4.2   |  23  |  0.49<L>    Ca    9.5      18 Jan 2018 07:50          VITALS  T(C): 36.8 (01-19-18 @ 05:25), Max: 36.8 (01-19-18 @ 05:25)  HR: 79 (01-19-18 @ 11:04) (74 - 88)  BP: 147/73 (01-19-18 @ 11:04) (130/71 - 156/82)  RR: 18 (01-19-18 @ 05:25) (18 - 18)  SpO2: 96% (01-19-18 @ 11:04) (96% - 99%)  Wt(kg): --    ALLERGIES  penicillin (Rash)      MEDICATIONS   acetaminophen   Tablet. 650 milliGRAM(s) Oral every 6 hours PRN  aspirin  chewable 81 milliGRAM(s) Oral daily  atorvastatin 80 milliGRAM(s) Oral at bedtime  clopidogrel Tablet 75 milliGRAM(s) Oral daily  enoxaparin Injectable 40 milliGRAM(s) SubCutaneous every 24 hours  senna 2 Tablet(s) Oral at bedtime  sodium chloride 0.9%. 1000 milliLiter(s) IV Continuous <Continuous>      ----------------------------------------------------------------------------------------  PHYSICAL EXAM  Constitutional - NAD, Comfortable  HEENT - NCAT, EOMI  Neck - Supple, No limited ROM  Chest - CTA bilaterally, No wheeze, No rhonchi, No crackles  Cardiovascular - RRR, S1S2, No murmurs  Abdomen - BS+, Soft, NTND  Extremities - No C/C/E, No calf tenderness   Neurologic Exam -                    Cognitive - Awake, Alert, AAO to self, place, date, year, situation     Communication - Fluent, No dysarthria, no aphasia     Cranial Nerves - CN 2-12 intact     Motor - No focal deficits                       Sensory - Intact to LT     Reflexes - DTR Intact, No primitive reflexive     Balance - WNL Static  Psychiatric - Mood stable, Affect WNL      Impression:    yo with CVA with      Plan:  PT- ROM, Bed Mob, Transfers, Amb w AD and bracing as needed  OT- ADLs, bracing  SLP- Dysphagia eval and treat  Prec- Falls, Cardiac  DVT Prophylaxis  Skin- Turn q2 h  Dispo- Cc: Difficulty speaking.     HPI:  Patient is a 72 year old female w/ no significant PMHx presenting w/ word-finding difficulty and R facial droop Her friend noticed on the phone that the patient was not making sense, and told her daughter about it.  The patient was then brought to the ED for her continued symptoms.  MRI w microvascular changes- no acute stroke.     REVIEW OF SYSTEMS:  No chest pain, shortness of breath, nausea, vomiting or diarhea.      PAST MEDICAL & SURGICAL HISTORY  No pertinent past medical history  H/O total hysterectomy  History of appendectomy  No significant past surgical history      SOCIAL HISTORY  Smoking - Denied, EtOH - Denied, Drugs - Denied    FUNCTIONAL HISTORY:   Lives alone in apartment  PTA Independent ambulation/ ADLs    CURRENT FUNCTIONAL STATUS:  Amb 100' CG    FAMILY HISTORY   No pertinent family history in first degree relatives      RECENT LABS/IMAGING  CBC Full  -  ( 18 Jan 2018 08:10 )  WBC Count : 6.55 K/uL  Hemoglobin : 14.0 g/dL  Hematocrit : 39.6 %  Platelet Count - Automated : 277 K/uL  Mean Cell Volume : 93.4 fl  Mean Cell Hemoglobin : 33.0 pg  Mean Cell Hemoglobin Concentration : 35.4 gm/dL  Auto Neutrophil # : x  Auto Lymphocyte # : x  Auto Monocyte # : x  Auto Eosinophil # : x  Auto Basophil # : x  Auto Neutrophil % : x  Auto Lymphocyte % : x  Auto Monocyte % : x  Auto Eosinophil % : x  Auto Basophil % : x    01-18    142  |  104  |  13  ----------------------------<  97  4.2   |  23  |  0.49<L>    Ca    9.5      18 Jan 2018 07:50          VITALS  T(C): 36.8 (01-19-18 @ 05:25), Max: 36.8 (01-19-18 @ 05:25)  HR: 79 (01-19-18 @ 11:04) (74 - 88)  BP: 147/73 (01-19-18 @ 11:04) (130/71 - 156/82)  RR: 18 (01-19-18 @ 05:25) (18 - 18)  SpO2: 96% (01-19-18 @ 11:04) (96% - 99%)  Wt(kg): --    ALLERGIES  penicillin (Rash)      MEDICATIONS   acetaminophen   Tablet. 650 milliGRAM(s) Oral every 6 hours PRN  aspirin  chewable 81 milliGRAM(s) Oral daily  atorvastatin 80 milliGRAM(s) Oral at bedtime  clopidogrel Tablet 75 milliGRAM(s) Oral daily  enoxaparin Injectable 40 milliGRAM(s) SubCutaneous every 24 hours  senna 2 Tablet(s) Oral at bedtime  sodium chloride 0.9%. 1000 milliLiter(s) IV Continuous <Continuous>      ----------------------------------------------------------------------------------------  PHYSICAL EXAM  Constitutional - NAD, Comfortable  HEENT - NCAT, EOMI  Neck - Supple, No limited ROM  Chest - CTA bilaterally, No wheeze, No rhonchi, No crackles  Cardiovascular - RRR, S1S2, No murmurs  Abdomen - BS+, Soft, NTND  Extremities - No C/C/E, No calf tenderness   Neurologic Exam -                    Cognitive - Awake, Alert, AAO to self, place, date, year, situation     Communication - Fluent, No dysarthria, no aphasia     Cranial Nerves - CN 2-12 intact     Motor - No focal deficits                       Sensory - Intact to LT     Reflexes - DTR Intact, No primitive reflexive     Balance - WNL Static  Psychiatric - Mood stable, Affect WNL      Impression:    yo with CVA with      Plan:  PT- ROM, Bed Mob, Transfers, Amb w AD and bracing as needed  OT- ADLs, bracing  SLP- Dysphagia eval and treat  Prec- Falls, Cardiac  DVT Prophylaxis  Skin- Turn q2 h  Dispo- Cc: Difficulty speaking.     HPI:  Patient is a 72 year old female w/ no significant PMHx presenting w/ word-finding difficulty and R facial droop Her friend noticed on the phone that the patient was not making sense, and told her daughter about it.  The patient was then brought to the ED for her continued symptoms.  MRI w microvascular changes- no acute stroke. Patient with difficulty walking.     REVIEW OF SYSTEMS:  + difficulty with speech, poor balance, No chest pain, shortness of breath, nausea, vomiting or diarhea.      PAST MEDICAL & SURGICAL HISTORY  No pertinent past medical history  H/O total hysterectomy  History of appendectomy  No significant past surgical history    FUNCTIONAL HISTORY:   Lives alone in apartment  PTA Independent ambulation/ ADLs    CURRENT FUNCTIONAL STATUS:  Amb 100' CG    FAMILY HISTORY   No pertinent family history in first degree relatives      RECENT LABS/IMAGING  CBC Full  -  ( 18 Jan 2018 08:10 )  WBC Count : 6.55 K/uL  Hemoglobin : 14.0 g/dL  Hematocrit : 39.6 %  Platelet Count - Automated : 277 K/uL  Mean Cell Volume : 93.4 fl  Mean Cell Hemoglobin : 33.0 pg  Mean Cell Hemoglobin Concentration : 35.4 gm/dL  Auto Neutrophil # : x  Auto Lymphocyte # : x  Auto Monocyte # : x  Auto Eosinophil # : x  Auto Basophil # : x  Auto Neutrophil % : x  Auto Lymphocyte % : x  Auto Monocyte % : x  Auto Eosinophil % : x  Auto Basophil % : x    01-18    142  |  104  |  13  ----------------------------<  97  4.2   |  23  |  0.49<L>    Ca    9.5      18 Jan 2018 07:50          VITALS  T(C): 36.8 (01-19-18 @ 05:25), Max: 36.8 (01-19-18 @ 05:25)  HR: 79 (01-19-18 @ 11:04) (74 - 88)  BP: 147/73 (01-19-18 @ 11:04) (130/71 - 156/82)  RR: 18 (01-19-18 @ 05:25) (18 - 18)  SpO2: 96% (01-19-18 @ 11:04) (96% - 99%)  Wt(kg): --    ALLERGIES  penicillin (Rash)      MEDICATIONS   acetaminophen   Tablet. 650 milliGRAM(s) Oral every 6 hours PRN  aspirin  chewable 81 milliGRAM(s) Oral daily  atorvastatin 80 milliGRAM(s) Oral at bedtime  clopidogrel Tablet 75 milliGRAM(s) Oral daily  enoxaparin Injectable 40 milliGRAM(s) SubCutaneous every 24 hours  senna 2 Tablet(s) Oral at bedtime  sodium chloride 0.9%. 1000 milliLiter(s) IV Continuous <Continuous>      ----------------------------------------------------------------------------------------  PHYSICAL EXAM  Constitutional - NAD, Comfortable  HEENT - NCAT, EOMI  Neck - Supple, No limited ROM  Chest - CTA bilaterally, No wheeze, No rhonchi, No crackles  Cardiovascular - RRR, S1S2, No murmurs  Abdomen - BS+, Soft, NTND  Extremities - No C/C/E, No calf tenderness   Neurologic Exam -                    Cognitive - Awake, Alert, AAO to self, place, date, year, situation     Motor - No focal deficits     Sensory - Intact to LT     Intact finger to nose     Some word finding difficulties  Psychiatric - Mood stable, Affect WNL      Impression:  73 yo with apparent CVA with gait/language     Plan:  PT- ROM, Bed Mob, Transfers, Amb w AD   OT- ADLs,   SLP- Aphasia eval  Prec- Falls, Cardiac  DVT Prophylaxis- Lovenox  Skin- Turn q2 h  Dispo- Will follow up rehab needs

## 2018-01-19 NOTE — PROGRESS NOTE ADULT - SUBJECTIVE AND OBJECTIVE BOX
Patient is a 71 yo woman with no significant PMH who presented with sudden speech difficulty and headache.  Admitted for CVA, now s/p ILR today     PHYSICAL EXAM:  V/S:  /73 HR 79 RR 19 Temp 98.6 O2 sat 96% on RA.  Int: Midsternal chest wall ILR site clean, dry, and dermabond dressing intact, No erythema or hematoma noted.       ASSESSMENT/PLAN: 	  -f/u loop wound check in EP clinic as scheduled on 2/2/18 at 9:25am.  -Post loop teaching re-enforced with pt and family at bedside.  -Discharge planning to rehab by primary team.

## 2018-01-19 NOTE — OCCUPATIONAL THERAPY INITIAL EVALUATION ADULT - ADDITIONAL COMMENTS
cont'd Symptoms returned and intensified while in CDU, decision was made to admit pt at that time. No recent fevers, chills, dizziness, changes in vision, weakness, numbness, tingling, CP, SOB, cough, nausea/vomiting, abdominal pain, changes in BMs/urination  1/16 CT/CTA Head & Neck: Mild atrophy and small vessel white matter ischemic changes. Normal CTA of the head and neck. No large vessel occlusion. No carotid or vertebral stenosis in the neck using NASCET criteria

## 2018-01-19 NOTE — PROGRESS NOTE ADULT - ASSESSMENT
ASSESSMENT: This is a 71 yo woman with no significant PMH who presented with sudden speech difficulty including word finding difficulty as well as a headache.    Impression: On 1/15/18 she developed mild-mod mixed aphasia and subtle right hemiparesis.  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    NEURO:  Neurologically improved vs admission, now with transcortical sensory aphasia continue close monitoring for neurologic deterioration, permissive HTN, titrate statin to LDL goal less than 70, MRI Brain w/o, MRA Head w/o and Neck w/contras pending today with sedation. Physical therapy with recommendation to Acute TBI.    ANTITHROMBOTIC THERAPY: ASA and Plavix for 3 weeks, per [facility] trial    PULMONARY: CXR clear, protecting airway, saturating well      CARDIOVASCULAR:1/ TTE-hyperdynamic LV systolic function, mild diastolic dysfunction, stage 1, no PFO, s/p placement of ICM for assessment of occult arrythmias,, cardiac monitoring without events                              SBP goal: normotension    GASTROINTESTINAL:  dysphagia screen  passed     Diet: Regular DASH    RENAL: BUN/Cr without acute change, good urine output      Na Goal: Greater than 135     Kapoor: No    HEMATOLOGY: H/H without acute change, Platelets 271      DVT ppx: Heparin s.c [] LMWH [x]     ID: afebrile, no leukocytosis     DISPOSITION: Acute TBI as per PT eval, pending PMR consult, once stable and workup is complete    CORE MEASURES:        Admission NIHSS: 3     TPA: [] YES [x] NO      LDL/HDL: 102/72     Depression Screen: na. unable to determine due to aphasia at this time.     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    Impression: On 1/15/18 she developed mild-mod mixed aphasia and subtle right hemiparesis.  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    NEURO:  Neurologically continues to improve in fluency of speech vs admission, now with transcortical sensory aphasia continue close monitoring for neurologic deterioration, permissive HTN, titrate statin to LDL goal less than 70, MRI Brain w/o, MRA Head w/o and Neck w/contras as noted above, a negative imaging stroke.  Video EEG to r/o seizure activity pending before discharge. Physical therapy with recommendation to Acute TBI.    ANTITHROMBOTIC THERAPY: ASA and Plavix for 3 weeks, per [facility] trial, than ASA alone    PULMONARY: , protecting airway, saturating well      CARDIOVASCULAR:1/ TTE-hyperdynamic LV systolic function, mild diastolic dysfunction, stage 1, no PFO, s/p placement of ICM for assessment of occult arrhythmias, cardiac monitoring without events, s/p ICM placement to assess for occult arrythmia ie: A fib.  Dressing dry and intact.                             SBP goal: normotension    GASTROINTESTINAL:  dysphagia screen  passed, tolerating diet     Diet: Regular DASH    RENAL: BUN/Cr without acute change, good urine output      Na Goal: Greater than 135     Kapoor: No    HEMATOLOGY: H/H without acute change, Platelets 277, s/s of bleeding      DVT ppx: Heparin s.c [] LMWH [x]     ID: afebrile, no leukocytosis, no s/s of infection     DISPOSITION: Acute TBI as per PT eval, pending PMR consult for confirmation of Acute TBE, once stable and workup is complete she may be discharged.    CORE MEASURES:        Admission NIHSS: 3     TPA: [] YES [x] NO      LDL/HDL: 102/72     Depression Screen: na. unable to determine due to aphasia at this time.     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    Impression: On 1/15/18 she developed mild-mod mixed aphasia and subtle right hemiparesis.  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    NEURO:  Neurologically continues to improve in fluency of speech vs admission, now aphasia resolved and essentially back to baseline; continue close monitoring for neurologic deterioration, permissive HTN, titrate statin to LDL goal less than 70, MRI Brain w/o, MRA Head w/o and Neck w/contras as noted above, presumptive diagnosis an MRI-negative stroke.  Video EEG to r/o differential diagnosis of seizure activity (stroke mimic) pending before discharge. Physical therapy with recommendation to Acute TBI.    ANTITHROMBOTIC THERAPY: ASA and Plavix for 3 weeks, per [facility] trial, than ASA alone    PULMONARY: , protecting airway, saturating well      CARDIOVASCULAR:1/ TTE-hyperdynamic LV systolic function, mild diastolic dysfunction, stage 1, no PFO, s/p placement of ICM for assessment of occult arrhythmias, cardiac monitoring without events, s/p ICM placement to assess for occult arrythmia ie: A fib.  Dressing dry and intact.                             SBP goal: normotension    GASTROINTESTINAL:  dysphagia screen  passed, tolerating diet     Diet: Regular DASH    RENAL: BUN/Cr without acute change, good urine output      Na Goal: Greater than 135     Kapoor: No    HEMATOLOGY: H/H without acute change, Platelets 277, s/s of bleeding      DVT ppx: Heparin s.c [] LMWH [x]     ID: afebrile, no leukocytosis, no s/s of infection     DISPOSITION: Acute TBI as per PT eval, pending PMR consult for confirmation of Acute TBE, once stable and workup is complete she may be discharged.    CORE MEASURES:        Admission NIHSS: 3     TPA: [] YES [x] NO      LDL/HDL: 102/72     Depression Screen: na. unable to determine due to aphasia at this time.     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    Impression: On 1/15/18 she developed mild-mod mixed aphasia and subtle right hemiparesis.  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    NEURO:  Neurologically continues to improve in fluency of speech vs admission, now aphasia resolved and essentially back to baseline; continue close monitoring for neurologic deterioration, permissive HTN, titrate statin to LDL goal less than 70, MRI Brain w/o, MRA Head w/o and Neck w/contras as noted above, presumptive diagnosis an MRI-negative stroke.  Video EEG to r/o differential diagnosis of seizure activity (stroke mimic) pending before discharge. Physical therapy with recommendation to Acute TBI.    ANTITHROMBOTIC THERAPY: ASA and Plavix for 3 weeks, per [facility] trial, than ASA alone    PULMONARY: , protecting airway, saturating well      CARDIOVASCULAR:1/ TTE-hyperdynamic LV systolic function, mild diastolic dysfunction, stage 1, no PFO, s/p placement of ICM for assessment of occult arrhythmias, cardiac monitoring without events, s/p ICM placement to assess for occult arrythmia ie: A fib.  Dressing dry and intact.                             SBP goal: normotension    GASTROINTESTINAL:  dysphagia screen  passed, tolerating diet     Diet: Regular DASH    RENAL: BUN/Cr without acute change, good urine output      Na Goal: Greater than 135     Kapoor: No    HEMATOLOGY: H/H without acute change, Platelets 277, s/s of bleeding      DVT ppx: Heparin s.c [] LMWH [x]     ID: afebrile, no leukocytosis, no s/s of infection     DISPOSITION: Acute TBI as per PT eval, pending PMR consult for confirmation of Acute TBE, once stable and workup is complete she may be discharged.    CORE MEASURES:        Admission NIHSS: 3     TPA: [] YES [x] NO      LDL/HDL: 102/72     Depression Screen: na. unable to determine due to aphasia at this time.     Statin Therapy: yes     Dysphagia Screen: [x] PASS [] FAIL     Smoking [] YES [x] NO      Afib [] YES [x] NO     Stroke Education [x] YES [] NO

## 2018-01-19 NOTE — OCCUPATIONAL THERAPY INITIAL EVALUATION ADULT - PERTINENT HX OF CURRENT PROBLEM, REHAB EVAL
72 year old female presenting w/ word-finding difficulty and R facial droop since 10PM last night. Woke up having the same symptoms as well as a retro-orbital headache. Friend noticed on the phone that patient was not making sense. Pt. brought to the ED for her continued symptoms.  Code stroke called after patient examined in ED, NIHSS 3, MRS 0, tPA not administered as patient out of window, and no intervention due to low NIHSS. cont'd

## 2018-01-19 NOTE — OCCUPATIONAL THERAPY INITIAL EVALUATION ADULT - SHORT TERM MEMORY, REHAB EVAL
Patient presents impairments in delayed recall. Unable to spell "world" forwards/backwards./impaired impaired/Patient presents impairments in delayed recall. Pt. stated 0/3 words, repeated in beginning of session, within a 5 min. timespan. Unable to spell "world" forwards/backwards.

## 2018-01-19 NOTE — OCCUPATIONAL THERAPY INITIAL EVALUATION ADULT - NS ASR FOLLOW COMMAND OT EVAL
able to follow multistep instructions/100% of the time 100% of the time/able to follow single-step instructions

## 2018-01-20 LAB — PA ADP PRP-ACNC: 125 PRU — LOW (ref 194–417)

## 2018-01-20 PROCEDURE — 95951: CPT | Mod: 26

## 2018-01-20 RX ORDER — LISINOPRIL 2.5 MG/1
5 TABLET ORAL DAILY
Qty: 0 | Refills: 0 | Status: DISCONTINUED | OUTPATIENT
Start: 2018-01-20 | End: 2018-01-24

## 2018-01-20 RX ADMIN — ENOXAPARIN SODIUM 40 MILLIGRAM(S): 100 INJECTION SUBCUTANEOUS at 05:55

## 2018-01-20 RX ADMIN — ATORVASTATIN CALCIUM 80 MILLIGRAM(S): 80 TABLET, FILM COATED ORAL at 21:40

## 2018-01-20 RX ADMIN — LISINOPRIL 5 MILLIGRAM(S): 2.5 TABLET ORAL at 16:30

## 2018-01-20 RX ADMIN — ZOLPIDEM TARTRATE 5 MILLIGRAM(S): 10 TABLET ORAL at 21:40

## 2018-01-20 RX ADMIN — SENNA PLUS 2 TABLET(S): 8.6 TABLET ORAL at 21:40

## 2018-01-20 RX ADMIN — Medication 81 MILLIGRAM(S): at 12:46

## 2018-01-20 NOTE — PROGRESS NOTE ADULT - ASSESSMENT
ASSESSMENT: This is a 71 yo woman with PMH Discoid SLE who presented with sudden speech difficulty including word finding difficulty as well as a headache.    Impression: On 1/15/18 she developed mild-mod mixed aphasia and subtle right hemiparesis.  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    NEURO:  Neurologically continues to improve in fluency of speech vs admission, now aphasia resolved and essentially back to baseline; continue close monitoring for neurologic deterioration, permissive HTN, titrate statin to LDL goal less than 70, MRI Brain w/o, MRA Head w/o and Neck w/contras as noted above, presumptive diagnosis an MRI-negative stroke.  Video EEG to r/o differential diagnosis of seizure activity (stroke mimic) pending before discharge. Physical therapy with recommendation to Acute TBI.    ANTITHROMBOTIC THERAPY: ASA and Plavix for 3 weeks, per [facility]ANCE trial, than ASA alone    PULMONARY: , protecting airway, saturating well      CARDIOVASCULAR:1/ TTE-hyperdynamic LV systolic function, mild diastolic dysfunction, stage 1, no PFO, s/p placement of ICM for assessment of occult arrhythmias, cardiac monitoring without events, s/p ICM placement to assess for occult arrythmia ie: A fib.  Dressing dry and intact.                             SBP goal: normotension    GASTROINTESTINAL:  dysphagia screen  passed, tolerating diet     Diet: Regular DASH    RENAL: BUN/Cr without acute change, good urine output      Na Goal: Greater than 135     Kapoor: No    HEMATOLOGY: H/H without acute change, Platelets 277, s/s of bleeding      DVT ppx: Heparin s.c [] LMWH [x]     ID: afebrile, no leukocytosis, no s/s of infection     DISPOSITION: Acute TBI as per PT eval, pending PMR consult for confirmation of Acute TBI, once stable and workup is complete she may be discharged.    CORE MEASURES:        Admission NIHSS: 3     TPA: [] YES [x] NO      LDL/HDL: 102/72     Depression Screen: na. unable to determine due to aphasia at this time.     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    Impression: On 1/15/18 she developed mild-mod mixed aphasia and subtle right hemiparesis.  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    NEURO:  Neurologically with fluency of speech back to her baseline vs admission,  aphasia now resolved.  Continue close monitoring for neurologic deterioration, BP management to normotension, titrate statin to LDL goal less than 70, MRI Brain w/o, MRA Head w/o and Neck w/contras as noted above, presumptive diagnosis an MRI-negative stroke.  Video EEG to r/o differential diagnosis of seizure activity (stroke mimic) as noted above, no seizure activity. Plan LP, off Plavix, to r/o differential diagnosis of encephalitis ie. Herpes, CMV etc prior to d/c. Physical therapy with recommendation to Acute TBI, PMR recommending repeat evaluation after the weekend.    ANTITHROMBOTIC THERAPY: ASA and Plavix for 3 weeks, per [facility]ANCE trial, than ASA alone. P2Y12 today 125, hold Plavix for LP in the next few days, will repeat P2Y12.    PULMONARY: , protecting airway, saturating well      CARDIOVASCULAR: TTE revealed hyperdynamic LV systolic function, mild diastolic dysfunction, stage 1, no PFO, cardiac monitoring without events, s/p ICM placement to assess for occult arrythmia ie: A fib.  Dressing dry and intact.   BP with many readings >140/80  started 5mg Lisinopril, will continue to monitor and follow up with her PCP as outpatient.                       SBP goal: normotension    GASTROINTESTINAL:  dysphagia screen  passed, tolerating diet     Diet: Regular DASH    RENAL: BUN/Cr without acute change, good urine output      Na Goal: Greater than 135     Kapoor: No    HEMATOLOGY: H/H without acute change, Platelets 277, states she had nosebleed overnight, will continue to monitor while on dual anti-platelet medication      DVT ppx: Heparin s.c [] LMWH [x]     ID: afebrile, no leukocytosis, no s/s of infection     DISPOSITION: Acute TBI as per PT eval vs home pending repeat eval, PMR will f/u rehab needs on Monday most likely, once stable and workup is complete she may be discharged.    CORE MEASURES:        Admission NIHSS: 3     TPA: [] YES [x] NO      LDL/HDL: 102/72     Depression Screen: 0     Statin Therapy: yes     Dysphagia Screen: [x] PASS [] FAIL     Smoking [] YES [x] NO      Afib [] YES [x] NO     Stroke Education [x] YES [] NO ASSESSMENT:   73 yo woman with vascular risk factors of age and discoid SLE was admitted to [facility] for evaluation of headache and language disturbance in the form of "nonsensical speech and word finding difficulties". She reports to have significant improvement in her neurological symptoms since her admission to the hospital. MRI brain during hospitalization did not show any evidence of acute infarct. CTA head and neck on admission did not show any significant intracranial or extracranial large vessel severe stenosis or occlusion. Video EEG showed mild generalized background slowing without any evidence of focal slowing or epileptiform discharges. Transthoracic echocardiogram did not show any structural cardiac source of embolism nor showed any evidence of PFO. She is currently undergoing prolonged cardiac monitoring with ICM.     Impression:  Cerebral embolism with/without cerebral infarction. 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 Continue close monitoring for neurologic deterioration, BP management to normotension, titrate statin to LDL goal less than 70, MRI Brain w/o, CTA head w/o and Neck w/contras as noted above, presumptive diagnosis an MRI-negative stroke. Video EEG to r/o differential diagnosis of seizure activity (stroke mimic) as noted above, no seizure activity. Plan LP, off Plavix, to r/o differential diagnosis of encephalitis i.e. Herpes, CMV etc prior to d/c. Physical therapy with recommendation to Acute TBI, PMR recommending repeat evaluation after the weekend.    ANTITHROMBOTIC THERAPY: ASA and clopidogrel for 3 weeks (currently holding clopidogrel in preparation of LP), per [facility]ANCE trial, followed by aspirin. P2Y12 today 125 - would recheck on Monday before considering LP     PULMONARY: Protecting airway, saturating well      CARDIOVASCULAR: TTE revealed hyperdynamic LV systolic function, mild diastolic dysfunction, stage 1, no PFO, cardiac monitoring without events, s/p ICM placement to assess for occult arrythmia ie: A fib.  Dressing dry and intact.   BP with many readings >140/80  started 5mg Lisinopril, will continue to monitor and follow up with her PCP as outpatient.                       SBP goal: Gradual normotension    GASTROINTESTINAL: Dysphagia screen passed, tolerating diet     Diet: Regular DASH    RENAL: BUN/Cr without acute change, good urine output      Na Goal: Greater than 135     Kapoor: No    HEMATOLOGY: H/H without acute change, Platelets 277, states she had nosebleed overnight, will continue to monitor while on dual anti-platelet medication      DVT ppx: Heparin s.c [] LMWH [x]     ID: Afebrile, no leukocytosis, no s/s of infection     DISPOSITION: Acute TBI as per PT eval vs home pending repeat eval, PMR will f/u rehab needs on Monday most likely, once stable and workup is complete she may be discharged. Plan was discussed with patient and available family members at bedside in detail. All the questions were answered and concerns were addressed.    CORE MEASURES:        Admission NIHSS: 3     TPA: [] YES [x] NO      LDL/HDL: 102/72     Depression Screen: 0     Statin Therapy: yes     Dysphagia Screen: [x] PASS [] FAIL     Smoking [] YES [x] NO      Afib [] YES [x] NO     Stroke Education [x] YES [] NO

## 2018-01-20 NOTE — EEG REPORT - NS EEG TEXT BOX
Study Interpretation:    1/19-1/20/2018    FINDINGS:  The background was continuous, spontaneously variable and reactive.  During wakefulness, the posteriorly dominant rhythm consisted of symmetric, well modulated 7-8 Hz activity, with an amplitude to 40 uV, that attenuated to eye opening.  Low amplitude central beta was noted in wakefulness.    Sleep Background:  Drowsiness was characterized by fragmentation, attenuation, and slowing of the background activity.    Segments of stage II sleep were noted by K-complexes and sleep spindles.    Background Slowing:  Generalized background slowing: Intermittent theta activity   Focal slowing: none was present.    Other Paroxysmal Non-Epileptiform Findings:    None.    Epileptiform Activity:   No epileptiform discharges were present.    Events:  No clinical events were recorded.  No seizures were recorded.    Activation Procedures:   -Hyperventilation was not performed.    -Photic stimulation was not performed.    Artifacts:  Intermittent myogenic and movement artifacts were noted.    ECG:  The heart rate on single channel ECG was predominantly between 70-80 BPM.    EEG Classification:  Abnormal study  -	Mild diffuse slowing    Impression:  Findings indicate non-specific mild diffuse or multifocal cerebral dysfunction. There were no epileptiform abnormalities recorded, which does not exclude the diagnosis of epilepsy.  Consider repeat study if clinically indicated.    	  Jak Woodall M.D.			    Attending Physician, [facility]
Study Interpretation:    1/19/2018    FINDINGS:  The background was continuous, spontaneously variable and reactive.  During wakefulness, the posteriorly dominant rhythm consisted of symmetric, well modulated 7-8 Hz activity, with an amplitude to 40 uV, that attenuated to eye opening.  Low amplitude central beta was noted in wakefulness.    Sleep Background:  Drowsiness was characterized by fragmentation, attenuation, and slowing of the background activity.    Segments of stage II sleep were not recorded.    Background Slowing:  Generalized background slowing: Intermittent theta activity   Focal slowing: none was present.    Other Paroxysmal Non-Epileptiform Findings:    None.    Epileptiform Activity:   No epileptiform discharges were present.    Events:  No clinical events were recorded.  No seizures were recorded.    Activation Procedures:   -Hyperventilation was not performed.    -Photic stimulation was not performed.    Artifacts:  Intermittent myogenic and movement artifacts were noted.    ECG:  The heart rate on single channel ECG was predominantly between 70-80 BPM.    EEG Classification:  Abnormal study  -	Mild diffuse slowing    Impression:  Findings indicate non-specific mild diffuse or multifocal cerebral dysfunction. There were no epileptiform abnormalities recorded, which does not exclude the diagnosis of epilepsy.  Consider repeat study if clinically indicated.    	  Jak Woodall M.D.			    Attending Physician, [facility]

## 2018-01-20 NOTE — PROGRESS NOTE ADULT - SUBJECTIVE AND OBJECTIVE BOX
THE PATIENT WAS SEEN AND EXAMINED BY ME WITH THE HOUSESTAFF AND STROKE TEAM DURING MORNING ROUNDS.   HPI:  Patient is a 72 year old woman w/ no significant PMHx who presented w/ word-finding difficulty and R facial droop since 10PM the night prior to admission.  She reported that she had been having some word-finding difficulties since the previous evening, however she did not follow up on it and went to bed.  She woke up still having the same symptoms as well as a retro-orbital headache.  Her friend noticed on the phone that the patient was not making sense, and told her daughter about it.  The patient was then brought to the ED for her continued symptoms.  Code stroke called after patient examined in ED, NIHSS 3, MRS 0, tPA not administered as patient out of window, and no intervention due to low NIHSS. Symptoms returned and intensified while in CDU, decision was made to admit pt at that time. No recent fevers, chills, dizziness, changes in vision, weakness, numbness, tingling, CP, SOB, cough, nausea/vomiting, abdominal pain, changes in BMs/urination      SUBJECTIVE: No events overnight.  No new neurologic complaints.      acetaminophen   Tablet. 650 milliGRAM(s) Oral every 6 hours PRN  aspirin  chewable 81 milliGRAM(s) Oral daily  atorvastatin 80 milliGRAM(s) Oral at bedtime  clopidogrel Tablet 75 milliGRAM(s) Oral daily  enoxaparin Injectable 40 milliGRAM(s) SubCutaneous every 24 hours  senna 2 Tablet(s) Oral at bedtime  sodium chloride 0.9%. 1000 milliLiter(s) IV Continuous <Continuous>  zolpidem 5 milliGRAM(s) Oral at bedtime PRN      PHYSICAL EXAM:   Vital Signs Last 24 Hrs  T(C): 37.1 (20 Jan 2018 04:35), Max: 37.2 (19 Jan 2018 23:18)  T(F): 98.7 (20 Jan 2018 04:35), Max: 99 (19 Jan 2018 23:18)  HR: 80 (20 Jan 2018 04:35) (79 - 91)  BP: 152/69 (20 Jan 2018 04:35) (147/73 - 161/83)  BP(mean): --  RR: 18 (20 Jan 2018 04:35) (18 - 18)  SpO2: 97% (20 Jan 2018 04:35) (96% - 98%)    General: No acute distress  HEENT: EOM intact, visual fields full  Abdomen: Soft, nontender, nondistended   Extremities: No edema    NEUROLOGICAL EXAM:  Mental status: Awake, alert, and attentive, speech fluent and prosodic; able to follow all commands, repetition intact, naming intact, no neglect  Cranial Nerves: Right naso-labial flattening, no nystagmus, speech fluent, no dysarthria,  tongue midline, shoulder shrug intact bilaterally.  Motor exam: Normal tone, no drift, 5/5 RUE, 5/5 RLE, 5/5 LUE, 5/5 LLE, normal fine finger movements.  Sensation: Intact to light touch   Coordination/ Gait: No dysmetria, gait not assessed     LABS:                        14.0   6.55  )-----------( 277      ( 18 Jan 2018 08:10 )             39.6    01-18    142  |  104  |  13  ----------------------------<  97  4.2   |  23  |  0.49<L>    Ca    9.5      18 Jan 2018 07:50      Hemoglobin A1C, Whole Blood: 5.4 % (01-17 @ 07:32)      IMAGING: Reviewed by me.   CT Brain and CT Angio Head and Neck w/ IV Cont (01.16.18 @ 12:52)   IMPRESSION: Mild atrophy and small vessel white matter ischemic changes.   Normal CTA of the head and neck. No large vessel occlusion. No carotid or   vertebral stenosis in the neck using NASCET criteria.    < from: MR Head No Cont (01.18.18 @ 17:30) >  IMPRESSION: Mild involutional change and microvascular ischemic type   changes. No acute infarct. No evidence of intracranial hemorrhage. Study   is somewhat motion limited as patient could not fully cooperate with the study. THE PATIENT WAS SEEN AND EXAMINED BY ME WITH THE HOUSESTAFF AND STROKE TEAM DURING MORNING ROUNDS.   HPI:  Patient is a 72 year old woman w/ no significant PMHx who presented w/ word-finding difficulty and R facial droop since 10PM the night prior to admission.  She reported that she had been having some word-finding difficulties since the previous evening, however she did not follow up on it and went to bed.  She woke up still having the same symptoms as well as a retro-orbital headache.  Her friend noticed on the phone that the patient was not making sense, and told her daughter about it.  The patient was then brought to the ED for her continued symptoms.  Code stroke called after patient examined in ED, NIHSS 3, MRS 0, tPA not administered as patient out of window, and no intervention due to low NIHSS. Symptoms returned and intensified while in CDU, decision was made to admit pt at that time. No recent fevers, chills, dizziness, changes in vision, weakness, numbness, tingling, CP, SOB, cough, nausea/vomiting, abdominal pain, changes in BMs/urination      SUBJECTIVE: No events overnight.  No new neurologic complaints.  Family states she is over 90% back to baseline    acetaminophen   Tablet. 650 milliGRAM(s) Oral every 6 hours PRN  aspirin  chewable 81 milliGRAM(s) Oral daily  atorvastatin 80 milliGRAM(s) Oral at bedtime  clopidogrel Tablet 75 milliGRAM(s) Oral daily  enoxaparin Injectable 40 milliGRAM(s) SubCutaneous every 24 hours  senna 2 Tablet(s) Oral at bedtime  sodium chloride 0.9%. 1000 milliLiter(s) IV Continuous <Continuous>  zolpidem 5 milliGRAM(s) Oral at bedtime PRN      PHYSICAL EXAM:   Vital Signs Last 24 Hrs  T(C): 37.1 (20 Jan 2018 04:35), Max: 37.2 (19 Jan 2018 23:18)  T(F): 98.7 (20 Jan 2018 04:35), Max: 99 (19 Jan 2018 23:18)  HR: 80 (20 Jan 2018 04:35) (79 - 91)  BP: 152/69 (20 Jan 2018 04:35) (147/73 - 161/83)  BP(mean): --  RR: 18 (20 Jan 2018 04:35) (18 - 18)  SpO2: 97% (20 Jan 2018 04:35) (96% - 98%)    General: No acute distress  HEENT: EOM intact, visual fields full  Abdomen: Soft, nontender, nondistended   Extremities: No edema    NEUROLOGICAL EXAM:  Mental status: Awake, alert, and attentive, speech fluent and prosodic; able to follow all commands, repetition intact, naming intact, no neglect  Cranial Nerves: Right naso-labial flattening, no nystagmus, speech fluent, no dysarthria,  tongue midline, shoulder shrug intact bilaterally.  Motor exam: Normal tone, subtle R pronator drift, 5/5 RUE, 5/5 RLE, 5/5 LUE, 5/5 LLE, normal fine finger movements.  Sensation: Intact to light touch   Coordination/ Gait: No dysmetria, gait not assessed     LABS:                        14.0   6.55  )-----------( 277      ( 18 Jan 2018 08:10 )             39.6    01-18    142  |  104  |  13  ----------------------------<  97  4.2   |  23  |  0.49<L>    Ca    9.5      18 Jan 2018 07:50      Hemoglobin A1C, Whole Blood: 5.4 % (01-17 @ 07:32)      IMAGING: Reviewed by me.   CT Brain and CT Angio Head and Neck w/ IV Cont (01.16.18 @ 12:52)   IMPRESSION: Mild atrophy and small vessel white matter ischemic changes.   Normal CTA of the head and neck. No large vessel occlusion. No carotid or   vertebral stenosis in the neck using NASCET criteria.    < from: MR Head No Cont (01.18.18 @ 17:30) >  IMPRESSION: Mild involutional change and microvascular ischemic type   changes. No acute infarct. No evidence of intracranial hemorrhage. Study   is somewhat motion limited as patient could not fully cooperate with the study.    EEG (01.20.18)  Mild diffuse slowing, non-specific mild diffuse or multifocal cerebral dysfunction.  There was   on epileptiform abnormalities recorded, which does not exclude the diagnosis of epilepsy. THE PATIENT WAS SEEN AND EXAMINED BY ME WITH THE HOUSESTAFF AND STROKE TEAM DURING MORNING ROUNDS.     HPI:  Patient is a 72 year old woman w/ no significant PMHx who presented w/ word-finding difficulty and R facial droop since 10PM the night prior to admission.  She reported that she had been having some word-finding difficulties since the previous evening, however she did not follow up on it and went to bed.  She woke up still having the same symptoms as well as a retro-orbital headache.  Her friend noticed on the phone that the patient was not making sense, and told her daughter about it.  The patient was then brought to the ED for her continued symptoms.  Code stroke called after patient examined in ED, NIHSS 3, MRS 0, tPA not administered as patient out of window, and no intervention due to low NIHSS. Symptoms returned and intensified while in CDU, decision was made to admit pt at that time. No recent fevers, chills, dizziness, changes in vision, weakness, numbness, tingling, CP, SOB, cough, nausea/vomiting, abdominal pain, changes in BMs/urination    SUBJECTIVE: No events overnight.  No new neurologic complaints.  Family states she is over 90% back to baseline    ROS: All negative except documented above.    MEDICATIONS:   acetaminophen   Tablet. 650 milliGRAM(s) Oral every 6 hours PRN  aspirin  chewable 81 milliGRAM(s) Oral daily  atorvastatin 80 milliGRAM(s) Oral at bedtime  clopidogrel Tablet 75 milliGRAM(s) Oral daily  enoxaparin Injectable 40 milliGRAM(s) SubCutaneous every 24 hours  senna 2 Tablet(s) Oral at bedtime  sodium chloride 0.9%. 1000 milliLiter(s) IV Continuous <Continuous>  zolpidem 5 milliGRAM(s) Oral at bedtime PRN      PHYSICAL EXAM:   Vital Signs Last 24 Hrs  T(C): 37.1 (20 Jan 2018 04:35), Max: 37.2 (19 Jan 2018 23:18)  T(F): 98.7 (20 Jan 2018 04:35), Max: 99 (19 Jan 2018 23:18)  HR: 80 (20 Jan 2018 04:35) (79 - 91)  BP: 152/69 (20 Jan 2018 04:35) (147/73 - 161/83)  BP(mean): --  RR: 18 (20 Jan 2018 04:35) (18 - 18)  SpO2: 97% (20 Jan 2018 04:35) (96% - 98%)    General: No acute distress  HEENT: EOM intact, visual fields full  Abdomen: Soft, nontender, nondistended   Extremities: No edema    NEUROLOGICAL EXAM:  Mental status: Awake, alert, and attentive, speech fluent and prosodic; able to follow all commands, repetition intact, naming intact, no neglect  Cranial Nerves: Subtle right naso-labial flattening, no nystagmus, speech fluent, no dysarthria,  tongue midline, shoulder shrug intact bilaterally.  Motor exam: Normal tone, subtle RUE pronator drift, 5/5 RUE, 5/5 RLE, 5/5 LUE, 5/5 LLE, normal fine finger movements.  Sensation: Intact to light touch   Coordination/ Gait: No dysmetria, gait not assessed     LABS:                        14.0   6.55  )-----------( 277      ( 18 Jan 2018 08:10 )             39.6    01-18    142  |  104  |  13  ----------------------------<  97  4.2   |  23  |  0.49<L>    Ca    9.5      18 Jan 2018 07:50      Hemoglobin A1C, Whole Blood: 5.4 % (01-17 @ 07:32)      IMAGING: Reviewed by me.   CT Brain and CT Angio Head and Neck w/ IV Cont (01.16.18 @ 12:52)   IMPRESSION: Mild atrophy and small vessel white matter ischemic changes.   Normal CTA of the head and neck. No large vessel occlusion. No carotid or   vertebral stenosis in the neck using NASCET criteria.    < from: MR Head No Cont (01.18.18 @ 17:30) >  IMPRESSION: Mild involutional change and microvascular ischemic type   changes. No acute infarct. No evidence of intracranial hemorrhage. Study   is somewhat motion limited as patient could not fully cooperate with the study.    EEG (01.20.18)  Mild diffuse slowing, non-specific mild diffuse or multifocal cerebral dysfunction.  There was   on epileptiform abnormalities recorded, which does not exclude the diagnosis of epilepsy.

## 2018-01-21 LAB — PA ADP PRP-ACNC: 142 PRU — LOW (ref 194–417)

## 2018-01-21 RX ORDER — INFLUENZA VIRUS VACCINE 15; 15; 15; 15 UG/.5ML; UG/.5ML; UG/.5ML; UG/.5ML
0.5 SUSPENSION INTRAMUSCULAR ONCE
Qty: 0 | Refills: 0 | Status: COMPLETED | OUTPATIENT
Start: 2018-01-21 | End: 2018-01-21

## 2018-01-21 RX ADMIN — INFLUENZA VIRUS VACCINE 0.5 MILLILITER(S): 15; 15; 15; 15 SUSPENSION INTRAMUSCULAR at 16:45

## 2018-01-21 RX ADMIN — ENOXAPARIN SODIUM 40 MILLIGRAM(S): 100 INJECTION SUBCUTANEOUS at 05:05

## 2018-01-21 RX ADMIN — Medication 650 MILLIGRAM(S): at 22:46

## 2018-01-21 RX ADMIN — Medication 81 MILLIGRAM(S): at 11:44

## 2018-01-21 RX ADMIN — LISINOPRIL 5 MILLIGRAM(S): 2.5 TABLET ORAL at 05:05

## 2018-01-21 RX ADMIN — Medication 650 MILLIGRAM(S): at 20:26

## 2018-01-21 NOTE — PROGRESS NOTE ADULT - SUBJECTIVE AND OBJECTIVE BOX
THE PATIENT WAS SEEN AND EXAMINED BY ME WITH THE HOUSESTAFF AND STROKE TEAM DURING MORNING ROUNDS.   HPI:  Patient is a 72 year old woman w/ no significant PMHx who presented w/ word-finding difficulty and R facial droop since 10PM the night prior to admission.  She reported that she had been having some word-finding difficulties since the previous evening, however she did not follow up on it and went to bed.  She woke up still having the same symptoms as well as a retro-orbital headache.  Her friend noticed on the phone that the patient was not making sense, and told her daughter about it.  The patient was then brought to the ED for her continued symptoms.  Code stroke called after patient examined in ED, NIHSS 3, MRS 0, tPA not administered as patient out of window, and no intervention due to low NIHSS. Symptoms returned and intensified while in CDU, decision was made to admit pt at that time. No recent fevers, chills, dizziness, changes in vision, weakness, numbness, tingling, CP, SOB, cough, nausea/vomiting, abdominal pain, changes in       SUBJECTIVE: No events overnight.  No new neurologic complaints.      acetaminophen   Tablet. 650 milliGRAM(s) Oral every 6 hours PRN  aspirin  chewable 81 milliGRAM(s) Oral daily  atorvastatin 80 milliGRAM(s) Oral at bedtime  enoxaparin Injectable 40 milliGRAM(s) SubCutaneous every 24 hours  lisinopril 5 milliGRAM(s) Oral daily  senna 2 Tablet(s) Oral at bedtime  sodium chloride 0.9%. 1000 milliLiter(s) IV Continuous <Continuous>  zolpidem 5 milliGRAM(s) Oral at bedtime PRN      PHYSICAL EXAM:   Vital Signs Last 24 Hrs  T(C): 36.3 (21 Jan 2018 04:46), Max: 36.8 (20 Jan 2018 23:07)  T(F): 97.4 (21 Jan 2018 04:46), Max: 98.3 (20 Jan 2018 23:07)  HR: 77 (21 Jan 2018 04:46) (73 - 94)  BP: 127/79 (21 Jan 2018 04:46) (109/64 - 130/76)  BP(mean): --  RR: 18 (21 Jan 2018 04:46) (18 - 18)  SpO2: 96% (21 Jan 2018 04:46) (94% - 96%)    General: No acute distress  HEENT: EOM intact, visual fields full  Abdomen: Soft, nontender, nondistended   Extremities: No edema    NEUROLOGICAL EXAM:  Mental status: Awake, alert, and attentive, speech fluent and prosodic; able to follow all commands, repetition intact, naming intact, no neglect  Cranial Nerves: Subtle right naso-labial flattening, no nystagmus, speech fluent, no dysarthria,  tongue midline, shoulder shrug intact bilaterally.  Motor exam: Normal tone, subtle RUE pronator drift, 5/5 RUE, 5/5 RLE, 5/5 LUE, 5/5 LLE, normal fine finger movements.  Sensation: Intact to light touch   Coordination/ Gait: No dysmetria, gait not assessed       LABS:         Hemoglobin A1C, Whole Blood: 5.4 % (01-17 @ 07:32)      IMAGING: Reviewed by me.   CT Brain and CT Angio Head and Neck w/ IV Cont (01.16.18 @ 12:52)   IMPRESSION: Mild atrophy and small vessel white matter ischemic changes.   Normal CTA of the head and neck. No large vessel occlusion. No carotid or   vertebral stenosis in the neck using NASCET criteria.    < from: MR Head No Cont (01.18.18 @ 17:30) >  IMPRESSION: Mild involutional change and microvascular ischemic type   changes. No acute infarct. No evidence of intracranial hemorrhage. Study   is somewhat motion limited as patient could not fully cooperate with the study.    EEG (01.20.18)  Mild diffuse slowing, non-specific mild diffuse or multifocal cerebral dysfunction.  There was   on epileptiform abnormalities recorded, which does not exclude the diagnosis of epilepsy. THE PATIENT WAS SEEN AND EXAMINED BY ME WITH THE HOUSESTAFF AND STROKE TEAM DURING MORNING ROUNDS.   HPI:  Patient is a 72 year old woman w/ no significant PMHx who presented w/ word-finding difficulty and R facial droop since 10PM the night prior to admission.  She reported that she had been having some word-finding difficulties since the previous evening, however she did not follow up on it and went to bed.  She woke up still having the same symptoms as well as a retro-orbital headache.  Her friend noticed on the phone that the patient was not making sense, and told her daughter about it.  The patient was then brought to the ED for her continued symptoms.  Code stroke called after patient examined in ED, NIHSS 3, MRS 0, tPA not administered as patient out of window, and no intervention due to low NIHSS. Symptoms returned and intensified while in CDU, decision was made to admit pt at that time. No recent fevers, chills, dizziness, changes in vision, weakness, numbness, tingling, CP, SOB, cough, nausea/vomiting, abdominal pain, changes in       SUBJECTIVE: No events overnight.  No new neurologic complaints. I'm feeling much better.    acetaminophen   Tablet. 650 milliGRAM(s) Oral every 6 hours PRN  aspirin  chewable 81 milliGRAM(s) Oral daily  atorvastatin 80 milliGRAM(s) Oral at bedtime  enoxaparin Injectable 40 milliGRAM(s) SubCutaneous every 24 hours  lisinopril 5 milliGRAM(s) Oral daily  senna 2 Tablet(s) Oral at bedtime  sodium chloride 0.9%. 1000 milliLiter(s) IV Continuous <Continuous>  zolpidem 5 milliGRAM(s) Oral at bedtime PRN      PHYSICAL EXAM:   Vital Signs Last 24 Hrs  T(C): 36.3 (21 Jan 2018 04:46), Max: 36.8 (20 Jan 2018 23:07)  T(F): 97.4 (21 Jan 2018 04:46), Max: 98.3 (20 Jan 2018 23:07)  HR: 77 (21 Jan 2018 04:46) (73 - 94)  BP: 127/79 (21 Jan 2018 04:46) (109/64 - 130/76)  BP(mean): --  RR: 18 (21 Jan 2018 04:46) (18 - 18)  SpO2: 96% (21 Jan 2018 04:46) (94% - 96%)    General: No acute distress  HEENT: EOM intact, visual fields full  Abdomen: Soft, nontender, nondistended   Extremities: No edema    NEUROLOGICAL EXAM:  Mental status: Awake, alert, and attentive, speech fluent and prosodic; able to follow all commands, repetition intact, naming intact, no neglect  Cranial Nerves: Subtle right naso-labial flattening, no nystagmus, speech fluent, no dysarthria,  tongue midline, shoulder shrug intact bilaterally.  Motor exam: Normal tone, subtle bilateral pronator drift, 5/5 RUE, 5/5 RLE, 5/5 LUE, 5/5 LLE, normal fine finger movements.  Sensation: Intact to light touch   Coordination/ Gait: No dysmetria, gait without falling to one side, steady, has difficulty with walking on the tip of her toes.      LABS:         Hemoglobin A1C, Whole Blood: 5.4 % (01-17 @ 07:32)      IMAGING: Reviewed by me.   CT Brain and CT Angio Head and Neck w/ IV Cont (01.16.18 @ 12:52)   IMPRESSION: Mild atrophy and small vessel white matter ischemic changes.   Normal CTA of the head and neck. No large vessel occlusion. No carotid or   vertebral stenosis in the neck using NASCET criteria.    < from: MR Head No Cont (01.18.18 @ 17:30) >  IMPRESSION: Mild involutional change and microvascular ischemic type   changes. No acute infarct. No evidence of intracranial hemorrhage. Study   is somewhat motion limited as patient could not fully cooperate with the study.    EEG (01.20.18)  Mild diffuse slowing, non-specific mild diffuse or multifocal cerebral dysfunction.  There was   on epileptiform abnormalities recorded, which does not exclude the diagnosis of epilepsy. THE PATIENT WAS SEEN AND EXAMINED BY ME WITH THE HOUSESTAFF AND STROKE TEAM DURING MORNING ROUNDS.     HPI:  Patient is a 72 year old woman w/ no significant PMHx who presented w/ word-finding difficulty and R facial droop since 10PM the night prior to admission.  She reported that she had been having some word-finding difficulties since the previous evening, however she did not follow up on it and went to bed.  She woke up still having the same symptoms as well as a retro-orbital headache.  Her friend noticed on the phone that the patient was not making sense, and told her daughter about it.  The patient was then brought to the ED for her continued symptoms.  Code stroke called after patient examined in ED, NIHSS 3, MRS 0, tPA not administered as patient out of window, and no intervention due to low NIHSS. Symptoms returned and intensified while in CDU, decision was made to admit pt at that time. No recent fevers, chills, dizziness, changes in vision, weakness, numbness, tingling, CP, SOB, cough, nausea/vomiting, abdominal pain, changes in     SUBJECTIVE: No events overnight.  No new neurologic complaints. I'm feeling much better.    acetaminophen   Tablet. 650 milliGRAM(s) Oral every 6 hours PRN  aspirin  chewable 81 milliGRAM(s) Oral daily  atorvastatin 80 milliGRAM(s) Oral at bedtime  enoxaparin Injectable 40 milliGRAM(s) SubCutaneous every 24 hours  lisinopril 5 milliGRAM(s) Oral daily  senna 2 Tablet(s) Oral at bedtime  sodium chloride 0.9%. 1000 milliLiter(s) IV Continuous <Continuous>  zolpidem 5 milliGRAM(s) Oral at bedtime PRN    ROS: All negative except documented above.    PHYSICAL EXAM:   Vital Signs Last 24 Hrs  T(C): 36.3 (21 Jan 2018 04:46), Max: 36.8 (20 Jan 2018 23:07)  T(F): 97.4 (21 Jan 2018 04:46), Max: 98.3 (20 Jan 2018 23:07)  HR: 77 (21 Jan 2018 04:46) (73 - 94)  BP: 127/79 (21 Jan 2018 04:46) (109/64 - 130/76)  BP(mean): --  RR: 18 (21 Jan 2018 04:46) (18 - 18)  SpO2: 96% (21 Jan 2018 04:46) (94% - 96%)    General: No acute distress  HEENT: EOM intact, visual fields full  Abdomen: Soft, nontender, nondistended   Extremities: No edema    NEUROLOGICAL EXAM:  Mental status: Awake, alert, and attentive, speech fluent and prosodic; able to follow all commands, repetition intact, naming intact, no neglect  Cranial Nerves: Subtle right naso-labial flattening, no nystagmus, speech fluent, no dysarthria,  tongue midline, shoulder shrug intact bilaterally.  Motor exam: Normal tone, subtle bilateral pronator drift, 5/5 RUE, 5/5 RLE, 5/5 LUE, 5/5 LLE, normal fine finger movements.  Sensation: Intact to light touch   Coordination/ Gait: No dysmetria, gait without falling to one side, steady, has difficulty with walking on the tip of her toes.      LABS:         Hemoglobin A1C, Whole Blood: 5.4 % (01-17 @ 07:32)      IMAGING: Reviewed by me.   CT Brain and CT Angio Head and Neck w/ IV Cont (01.16.18 @ 12:52)   IMPRESSION: Mild atrophy and small vessel white matter ischemic changes.   Normal CTA of the head and neck. No large vessel occlusion. No carotid or   vertebral stenosis in the neck using NASCET criteria.    < from: MR Head No Cont (01.18.18 @ 17:30) >  IMPRESSION: Mild involutional change and microvascular ischemic type   changes. No acute infarct. No evidence of intracranial hemorrhage. Study   is somewhat motion limited as patient could not fully cooperate with the study.    EEG (01.20.18)  Mild diffuse slowing, non-specific mild diffuse or multifocal cerebral dysfunction.  There was   on epileptiform abnormalities recorded, which does not exclude the diagnosis of epilepsy.

## 2018-01-21 NOTE — PROGRESS NOTE ADULT - ASSESSMENT
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 She reports to have significant improvement in her neurological symptoms since her admission to the hospital. MRI brain during hospitalization did not show any evidence of acute infarct. CTA head and neck on admission did not show any significant intracranial or extracranial large vessel severe stenosis or occlusion. Video EEG showed mild generalized background slowing without any evidence of focal slowing or epileptiform discharges. Transthoracic echocardiogram did not show any structural cardiac source of embolism nor showed any evidence of PFO. She is currently undergoing prolonged cardiac monitoring with ICM.     Impression:  Cerebral embolism with/without cerebral infarction. 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 Continue close monitoring for neurologic deterioration, BP management to normotension, titrate statin to LDL goal less than 70, MRI Brain w/o, CTA head w/o and Neck w/contras as noted above, presumptive diagnosis an MRI-negative stroke. Video EEG to r/o differential diagnosis of seizure activity (stroke mimic) as noted above, no seizure activity. Plan LP, off Plavix, to r/o differential diagnosis of encephalitis i.e. Herpes, CMV etc prior to d/c. Physical therapy with recommendation to Acute TBI, PMR recommending repeat evaluation after the weekend.    ANTITHROMBOTIC THERAPY: ASA and clopidogrel for 3 weeks (currently holding clopidogrel in preparation of LP), per [facility]ANCE trial, followed by aspirin. P2Y12 today 125 - would recheck on Monday before considering LP     PULMONARY: Protecting airway, saturating well      CARDIOVASCULAR: TTE revealed hyperdynamic LV systolic function, mild diastolic dysfunction, stage 1, no PFO, cardiac monitoring without events, s/p ICM placement to assess for occult arrythmia ie: A fib.  Dressing dry and intact.   BP with many readings >140/80  started 5mg Lisinopril, will continue to monitor and follow up with her PCP as outpatient.                       SBP goal: Gradual normotension    GASTROINTESTINAL: Dysphagia screen passed, tolerating diet     Diet: Regular DASH    RENAL:  good urine output      Na Goal: Greater than 135     Kapoor: No    HEMATOLOGY: states she had nosebleed 1/20, will continue to monitor while on dual anti-platelet medication      DVT ppx: Heparin s.c [] LMWH [x]     ID: Afebrile, no leukocytosis, no s/s of infection     DISPOSITION: Acute TBI as per PT eval vs home pending repeat eval, PMR will f/u rehab needs on Monday most likely, once stable and workup is complete she may be discharged. Plan was discussed with patient and available family members at bedside in detail. All the questions were answered and concerns were addressed.    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 She reports to have significant improvement in her neurological symptoms since her admission to the hospital. MRI brain during hospitalization did not show any evidence of acute infarct. CTA head and neck on admission did not show any significant intracranial or extracranial large vessel severe stenosis or occlusion. Video EEG showed mild generalized background slowing without any evidence of focal slowing or epileptiform discharges. Transthoracic echocardiogram did not show any structural cardiac source of embolism nor showed any evidence of PFO. She is currently undergoing prolonged cardiac monitoring with ICM.     Impression:  Cerebral embolism with/without cerebral infarction. 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 Continue close monitoring for neurologic deterioration, BP management to normotension, titrate statin to LDL goal less than 70, MRI Brain w/o, CTA head w/o and Neck w/contras as noted above, presumptive diagnosis an MRI-negative stroke. Video EEG to r/o differential diagnosis of seizure activity (stroke mimic) as noted above, no seizure activity. Plan LP, off Plavix, to r/o differential diagnosis of encephalitis i.e. Herpes, CMV etc prior to d/c. Physical therapy with recommendation to Acute TBI, PMR recommending repeat evaluation after the weekend.    ANTITHROMBOTIC THERAPY: ASA and clopidogrel for 3 weeks (currently holding clopidogrel in preparation of LP), per [facility]ANCE trial, followed by aspirin. P2Y12 1/20- 125, today 142, would recheck on Monday before considering LP     PULMONARY: Protecting airway, saturating well      CARDIOVASCULAR: TTE revealed hyperdynamic LV systolic function, mild diastolic dysfunction, stage 1, no PFO, cardiac monitoring without events, s/p ICM placement to assess for occult arrythmia ie: A fib.  Dressing dry and intact. Reviewed with family and patient at bedside how to use bedside monitoring device for ICM, good teach back  BP was elevated started 5mg Lisinopril, will continue to monitor, BP now  /60-70, will follow up with her PCP as outpatient.                       SBP goal: Gradual normotension    GASTROINTESTINAL: Dysphagia screen passed, tolerating diet     Diet: Regular DASH    RENAL:  good urine output      Na Goal: Greater than 135     Kapoor: No    HEMATOLOGY: states she had mild nosebleed 1/20, no nosebleed in 24 hours, will continue to monitor while on dual anti-platelet medication      DVT ppx: Heparin s.c [] LMWH [x]     ID: Afebrile, no leukocytosis, no s/s of infection     DISPOSITION: Acute TBI as per PT eval vs home pending repeat eval, PMR will f/u rehab needs on Monday most likely will be discharged, once stable and workup is complete. Plan was discussed with patient and available family members at bedside in detail. All the questions were answered and concerns were addressed.    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 She reports to have significant improvement in her neurological symptoms since her admission to the hospital. MRI brain during hospitalization did not show any evidence of acute infarct. CTA head and neck on admission did not show any significant intracranial or extracranial large vessel severe stenosis or occlusion. Video EEG showed mild generalized background slowing without any evidence of focal slowing or epileptiform discharges. Transthoracic echocardiogram did not show any structural cardiac source of embolism nor showed any evidence of PFO. She is currently undergoing prolonged cardiac monitoring with ICM.     Impression:  Cerebral embolism with/without cerebral infarction. 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 Continue close monitoring for neurologic deterioration, BP management to normotension, titrate statin to LDL goal less than 70, MRI Brain w/o, CTA head w/o and Neck w/contras as noted above, presumptive diagnosis an MRI-negative stroke. Video EEG to r/o differential diagnosis of seizure activity (stroke mimic) as noted above, no seizure activity. Plan LP, off Plavix, to r/o differential diagnosis of encephalitis i.e. Herpes, CMV etc prior to d/c. Physical therapy with recommendation to Acute TBI, PMR recommending repeat evaluation after the weekend.    ANTITHROMBOTIC THERAPY: ASA and clopidogrel for 3 weeks (currently holding clopidogrel in preparation of LP), per [facility]ANCE trial, followed by aspirin. P2Y12 1/20- 125, today 142, would recheck on Monday before considering LP     PULMONARY: Protecting airway, saturating well      CARDIOVASCULAR: TTE revealed hyperdynamic LV systolic function, mild diastolic dysfunction, stage 1, no PFO, cardiac monitoring without events, s/p ICM placement to assess for occult arrythmia ie: A fib.  Dressing dry and intact. Reviewed with family and patient at bedside how to use bedside monitoring device for ICM, good teach back  BP was elevated started 5mg Lisinopril, will continue to monitor, BP now  /60-70, will follow up with her PCP as outpatient.                       SBP goal: Gradual normotension    GASTROINTESTINAL: Dysphagia screen passed, tolerating diet     Diet: Regular DASH    RENAL: Good urine output      Na Goal: Greater than 135     Kapoor: No    HEMATOLOGY: States she had mild nosebleed 1/20, no nosebleed in 24 hours, will continue to monitor while on dual anti-platelet medication      DVT ppx: Heparin s.c [] LMWH [x]     ID: Afebrile, no leukocytosis, no s/s of infection     DISPOSITION: Acute TBI as per PT eval vs home pending repeat eval, PMR will f/u rehab needs on Monday most likely will be discharged, once stable and workup is complete. Plan was discussed with patient and available family members at bedside in detail. All the questions were answered and concerns were addressed.    CORE MEASURES:        Admission NIHSS: 3     TPA: [] YES [x] NO      LDL/HDL: 102/72     Depression Screen: 0     Statin Therapy: yes     Dysphagia Screen: [x] PASS [] FAIL     Smoking [] YES [x] NO      Afib [] YES [x] NO     Stroke Education [x] YES [] NO

## 2018-01-22 LAB
ANION GAP SERPL CALC-SCNC: 13 MMOL/L — SIGNIFICANT CHANGE UP (ref 5–17)
BUN SERPL-MCNC: 20 MG/DL — SIGNIFICANT CHANGE UP (ref 7–23)
CALCIUM SERPL-MCNC: 9.6 MG/DL — SIGNIFICANT CHANGE UP (ref 8.4–10.5)
CHLORIDE SERPL-SCNC: 100 MMOL/L — SIGNIFICANT CHANGE UP (ref 96–108)
CO2 SERPL-SCNC: 25 MMOL/L — SIGNIFICANT CHANGE UP (ref 22–31)
CREAT SERPL-MCNC: 0.6 MG/DL — SIGNIFICANT CHANGE UP (ref 0.5–1.3)
GLUCOSE SERPL-MCNC: 107 MG/DL — HIGH (ref 70–99)
HCT VFR BLD CALC: 39.5 % — SIGNIFICANT CHANGE UP (ref 34.5–45)
HGB BLD-MCNC: 13.4 G/DL — SIGNIFICANT CHANGE UP (ref 11.5–15.5)
MCHC RBC-ENTMCNC: 31.6 PG — SIGNIFICANT CHANGE UP (ref 27–34)
MCHC RBC-ENTMCNC: 33.9 GM/DL — SIGNIFICANT CHANGE UP (ref 32–36)
MCV RBC AUTO: 93.2 FL — SIGNIFICANT CHANGE UP (ref 80–100)
PA ADP PRP-ACNC: 170 PRU — LOW (ref 194–417)
PLATELET # BLD AUTO: 287 K/UL — SIGNIFICANT CHANGE UP (ref 150–400)
POTASSIUM SERPL-MCNC: 3.8 MMOL/L — SIGNIFICANT CHANGE UP (ref 3.5–5.3)
POTASSIUM SERPL-SCNC: 3.8 MMOL/L — SIGNIFICANT CHANGE UP (ref 3.5–5.3)
RBC # BLD: 4.24 M/UL — SIGNIFICANT CHANGE UP (ref 3.8–5.2)
RBC # FLD: 12.8 % — SIGNIFICANT CHANGE UP (ref 10.3–14.5)
SODIUM SERPL-SCNC: 138 MMOL/L — SIGNIFICANT CHANGE UP (ref 135–145)
WBC # BLD: 7.3 K/UL — SIGNIFICANT CHANGE UP (ref 3.8–10.5)
WBC # FLD AUTO: 7.3 K/UL — SIGNIFICANT CHANGE UP (ref 3.8–10.5)

## 2018-01-22 PROCEDURE — 99232 SBSQ HOSP IP/OBS MODERATE 35: CPT | Mod: GC

## 2018-01-22 RX ORDER — LANOLIN ALCOHOL/MO/W.PET/CERES
5 CREAM (GRAM) TOPICAL AT BEDTIME
Qty: 0 | Refills: 0 | Status: DISCONTINUED | OUTPATIENT
Start: 2018-01-22 | End: 2018-01-23

## 2018-01-22 RX ADMIN — Medication 5 MILLIGRAM(S): at 21:56

## 2018-01-22 RX ADMIN — ENOXAPARIN SODIUM 40 MILLIGRAM(S): 100 INJECTION SUBCUTANEOUS at 05:16

## 2018-01-22 RX ADMIN — SENNA PLUS 2 TABLET(S): 8.6 TABLET ORAL at 21:56

## 2018-01-22 RX ADMIN — Medication 650 MILLIGRAM(S): at 22:00

## 2018-01-22 RX ADMIN — Medication 650 MILLIGRAM(S): at 22:30

## 2018-01-22 RX ADMIN — LISINOPRIL 5 MILLIGRAM(S): 2.5 TABLET ORAL at 05:16

## 2018-01-22 RX ADMIN — Medication 81 MILLIGRAM(S): at 11:07

## 2018-01-22 RX ADMIN — ATORVASTATIN CALCIUM 80 MILLIGRAM(S): 80 TABLET, FILM COATED ORAL at 21:56

## 2018-01-22 NOTE — PROGRESS NOTE ADULT - SUBJECTIVE AND OBJECTIVE BOX
HPI/Interval evens    72 year old female w/ no significant PMHx presenting w/ word-finding difficulty and R facial droop Her friend noticed on the phone that the patient was not making sense, and told her daughter about it.  The patient was then brought to the ED for her continued symptoms.  MRI w microvascular changes- no acute stroke suggestive of MRI negative CVA. TTE showed hyperdynamic LV systolic function, mild diastolic dysfunction, stage 1, no PFO, cardiac monitoring without events s/p ILR on 1/19. EEG/video EEG negative for seizure activity, started on ASA and plavix with 1 nosebleed      FUNCTIONAL PROGRESS  1/18   Bed mobility- supervision  Sit-stand  CG  ambulation - CG 50'x2 with left sided lean/veering  1/19  ADLs - supervision- CG      REVIEW OF SYSTEMS  Constitutional - No fever,  No fatigue  HEENT - No vertigo, No neck pain  Neurological - No headaches, No memory loss, No loss of strength, No numbness, No tremors  Skin - No rashes, No lesions   Musculoskeletal - No joint pain, No joint swelling, No muscle pain  Psychiatric - No depression, No anxiety    VITALS  T(C): 36.6 (01-22-18 @ 08:26), Max: 36.8 (01-21-18 @ 19:22)  HR: 72 (01-22-18 @ 08:26) (67 - 86)  BP: 115/72 (01-22-18 @ 08:26) (106/67 - 119/74)  RR: 18 (01-22-18 @ 08:26) (17 - 18)  SpO2: 97% (01-22-18 @ 08:26) (95% - 99%)  Wt(kg): --    MEDICATIONS   acetaminophen   Tablet. 650 milliGRAM(s) every 6 hours PRN  aspirin  chewable 81 milliGRAM(s) daily  atorvastatin 80 milliGRAM(s) at bedtime  diazepam    Tablet 5 milliGRAM(s) once  enoxaparin Injectable 40 milliGRAM(s) every 24 hours  lisinopril 5 milliGRAM(s) daily  senna 2 Tablet(s) at bedtime  sodium chloride 0.9%. 1000 milliLiter(s) <Continuous>  zolpidem 5 milliGRAM(s) at bedtime PRN      CVA - ASA and plavix x 3 weeks then ASA alone, lipitor  HTN- lisiniopril  Pain- tylenol PRN  DVT PPX - Lovenox  PT &OT - ROM, stretching, strengthening, gait/ADL/balance training  Dispo - HPI/Interval events    72 year old female w/ no significant PMHx presenting w/ word-finding difficulty and R facial droop Her friend noticed on the phone that the patient was not making sense, and told her daughter about it.  The patient was then brought to the ED for her continued symptoms.  MRI w microvascular changes- no acute stroke suggestive of MRI negative CVA. TTE showed hyperdynamic LV systolic function, mild diastolic dysfunction, stage 1, no PFO, cardiac monitoring without events s/p ILR on 1/19. EEG/video EEG negative for seizure activity, started on ASA and plavix with 1 nosebleed      FUNCTIONAL PROGRESS  1/18   Bed mobility- supervision  Sit-stand  CG  ambulation - CG 50'x2 with left sided lean/veering  1/19  ADLs - supervision- CG      REVIEW OF SYSTEMS  Constitutional - No fever,  No fatigue  HEENT - No vertigo, No neck pain  Neurological - No headaches, No memory loss, No loss of strength, No numbness, No tremors  Skin - No rashes, No lesions   Musculoskeletal - No joint pain, No joint swelling, No muscle pain  Psychiatric - No depression, No anxiety    VITALS  T(C): 36.6 (01-22-18 @ 08:26), Max: 36.8 (01-21-18 @ 19:22)  HR: 72 (01-22-18 @ 08:26) (67 - 86)  BP: 115/72 (01-22-18 @ 08:26) (106/67 - 119/74)  RR: 18 (01-22-18 @ 08:26) (17 - 18)  SpO2: 97% (01-22-18 @ 08:26) (95% - 99%)  Wt(kg): --    MEDICATIONS   acetaminophen   Tablet. 650 milliGRAM(s) every 6 hours PRN  aspirin  chewable 81 milliGRAM(s) daily  atorvastatin 80 milliGRAM(s) at bedtime  diazepam    Tablet 5 milliGRAM(s) once  enoxaparin Injectable 40 milliGRAM(s) every 24 hours  lisinopril 5 milliGRAM(s) daily  senna 2 Tablet(s) at bedtime  sodium chloride 0.9%. 1000 milliLiter(s) <Continuous>  zolpidem 5 milliGRAM(s) at bedtime PRN  _________________________________________________________________________________________    PHYSICAL EXAM  Constitutional - NAD, Comfortable  HEENT - NCAT, EOMI  Neck - Supple, No limited ROM  Chest - CTA bilaterally, No wheeze, No rhonchi, No crackles  Cardiovascular - RRR, S1S2, No murmurs  Abdomen - BS+, Soft, NTND  Extremities - No C/C/E, No calf tenderness   Neurologic Exam -                    Cognitive - Awake, Alert, AAO to self, place, date, year, situation     Motor - No focal deficits     Sensory - Intact to LT     Intact finger to nose     Some word finding difficulties  Psychiatric - Mood stable, Affect WNL      Impression:  73 yo with 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  The patient was then brought to the ED for her continued symptoms.  MRI w microvascular changes- no acute stroke suggestive of MRI negative CVA. TTE showed hyperdynamic LV systolic function, mild diastolic dysfunction, stage 1, no PFO, cardiac monitoring without events s/p ILR on 1/19. EEG/video EEG negative for seizure activity, started on ASA and plavix with 1 nosebleed, Plavix on hold.    Patient feels improved word-finding difficulties. She and daughter feel safe for DC home -- will have 24 hour supervision for approx 1 week from children, if needed they will be willing to private hire vs have friends stay.       FUNCTIONAL PROGRESS  1/18   Bed mobility- supervision  Sit-stand  CG  ambulation - CG 50'x2 with left sided lean/veering  1/19  ADLs - supervision- CG      REVIEW OF SYSTEMS  Constitutional - + fatigue  HEENT - No vertigo, No neck pain  Neurological - No headaches, No loss of strength, No numbness, No tremors  Skin - No rashes, No lesions   Musculoskeletal -back pain from bed  Psychiatric - No depression, No anxiety    VITALS  T(C): 36.6 (01-22-18 @ 08:26), Max: 36.8 (01-21-18 @ 19:22)  HR: 72 (01-22-18 @ 08:26) (67 - 86)  BP: 115/72 (01-22-18 @ 08:26) (106/67 - 119/74)  RR: 18 (01-22-18 @ 08:26) (17 - 18)  SpO2: 97% (01-22-18 @ 08:26) (95% - 99%)  Wt(kg): --    MEDICATIONS   acetaminophen   Tablet. 650 milliGRAM(s) every 6 hours PRN  aspirin  chewable 81 milliGRAM(s) daily  atorvastatin 80 milliGRAM(s) at bedtime  diazepam    Tablet 5 milliGRAM(s) once  enoxaparin Injectable 40 milliGRAM(s) every 24 hours  lisinopril 5 milliGRAM(s) daily  senna 2 Tablet(s) at bedtime  sodium chloride 0.9%. 1000 milliLiter(s) <Continuous>  zolpidem 5 milliGRAM(s) at bedtime PRN  _________________________________________________________________________________________    PHYSICAL EXAM  Constitutional - NAD, Comfortable  HEENT - NCAT, EOMI  Neck - Supple, No limited ROM  Chest - No distress, symmetrical chest expansion  Cardiovascular -RRR +S1/S2  Abdomen - Soft, NTND  Extremities - No calf tenderness, mild symmetrical LE edema  Neurologic Exam -                    Cognitive - Awake, Alert, AAO to self, place, date, year, situation     Motor - No focal deficits     Sensory - Intact to LT     Intact finger to nose     word finding difficulties improved  Cognitive - 16/30 on MOCA per OT  Psychiatric - Mood stable, Affect WNL    Impression:  73 yo with MRI negative CVA with  cognitive impairments, mild word-finding difficulties and balance impairment.     CVA - ASA and plavix x 3 weeks then ASA alone, lipitor  HTN- lisiniopril  Pain- tylenol PRN  DVT PPX - Lovenox  PT &OT -  ROM, Bed Mob, Transfers, Amb w AD   Prec- Falls, Cardiac  DVT Prophylaxis- Lovenox  Skin- Turn q2   Dispo - HPI/Interval events    72 year old female w/ no significant PMHx presenting w/ word-finding difficulty and R facial droop Her friend noticed on the phone that the patient was not making sense, and told her daughter about it.  The patient was then brought to the ED for her continued symptoms.  MRI w microvascular changes- no acute stroke suggestive of MRI negative CVA. TTE showed hyperdynamic LV systolic function, mild diastolic dysfunction, stage 1, no PFO, cardiac monitoring without events s/p ILR on 1/19. EEG/video EEG negative for seizure activity, started on ASA and plavix with 1 nosebleed, Plavix on hold.    Patient feels improved word-finding difficulties. Per daughter, she is planned for spinal tap tomorrow. She and daughter feel safe for DC home -- will have 24 hour supervision for approx 1 week from children, if needed they will be willing to private hire vs have friends stay.       FUNCTIONAL PROGRESS  1/18   Bed mobility- supervision  Sit-stand  CG  ambulation - CG 50'x2 with left sided lean/veering  1/19  ADLs - supervision- CG      REVIEW OF SYSTEMS  Constitutional - + fatigue  HEENT - No vertigo, No neck pain  Neurological - No headaches, No loss of strength, No numbness, No tremors  Skin - No rashes, No lesions   Musculoskeletal -back pain from bed  Psychiatric - No depression, No anxiety    VITALS  T(C): 36.6 (01-22-18 @ 08:26), Max: 36.8 (01-21-18 @ 19:22)  HR: 72 (01-22-18 @ 08:26) (67 - 86)  BP: 115/72 (01-22-18 @ 08:26) (106/67 - 119/74)  RR: 18 (01-22-18 @ 08:26) (17 - 18)  SpO2: 97% (01-22-18 @ 08:26) (95% - 99%)  Wt(kg): --    MEDICATIONS   acetaminophen   Tablet. 650 milliGRAM(s) every 6 hours PRN  aspirin  chewable 81 milliGRAM(s) daily  atorvastatin 80 milliGRAM(s) at bedtime  diazepam    Tablet 5 milliGRAM(s) once  enoxaparin Injectable 40 milliGRAM(s) every 24 hours  lisinopril 5 milliGRAM(s) daily  senna 2 Tablet(s) at bedtime  sodium chloride 0.9%. 1000 milliLiter(s) <Continuous>  zolpidem 5 milliGRAM(s) at bedtime PRN  _________________________________________________________________________________________    PHYSICAL EXAM  Constitutional - NAD, Comfortable  HEENT - NCAT, EOMI  Neck - Supple, No limited ROM  Chest - No distress, symmetrical chest expansion  Cardiovascular -RRR +S1/S2  Abdomen - Soft, NTND  Extremities - No calf tenderness, mild symmetrical LE edema  Neurologic Exam -                    Cognitive - Awake, Alert, AAO to self, place, date, year, situation     Motor - No focal deficits     Sensory - Intact to LT     Intact finger to nose     word finding difficulties improved  Cognitive - 16/30 on MOCA per OT  Psychiatric - Mood stable, Affect WNL    Impression:  71 yo with MRI negative CVA with  cognitive impairments, mild word-finding difficulties and balance impairment.     CVA - ASA and plavix x 3 weeks then ASA alone, lipitor  HTN- lisiniopril  Pain- tylenol PRN  DVT PPX - Lovenox  PT &OT -  ROM, Bed Mob, Transfers, Amb w AD   Prec- Falls, Cardiac  DVT Prophylaxis- Lovenox  Skin- Turn q2   Dispo - HPI/Interval events    72 year old female w/ no significant PMHx presenting w/ word-finding difficulty and R facial droop Her friend noticed on the phone that the patient was not making sense, and told her daughter about it.  The patient was then brought to the ED for her continued symptoms.  MRI w microvascular changes- no acute stroke suggestive of MRI negative CVA. TTE showed hyperdynamic LV systolic function, mild diastolic dysfunction, stage 1, no PFO, cardiac monitoring without events s/p ILR on 1/19. EEG/video EEG negative for seizure activity, started on ASA and plavix with 1 nosebleed, Plavix on hold.    Patient feels improved word-finding difficulties. Per daughter, she is planned for spinal tap tomorrow. She and daughter feel safe for DC home -- will have 24 hour supervision for approx 1 week from children, if needed they will be willing to private hire vs have friends stay.       FUNCTIONAL PROGRESS  1/22  Sit-stand  supervision  ambulation - supervision 250'  1/19  ADLs - supervision- CG      REVIEW OF SYSTEMS  Constitutional - + fatigue  HEENT - No vertigo, No neck pain  Neurological - No headaches, No loss of strength, No numbness, No tremors  Skin - No rashes, No lesions   Musculoskeletal -back pain from bed  Psychiatric - No depression, No anxiety    VITALS  T(C): 36.6 (01-22-18 @ 08:26), Max: 36.8 (01-21-18 @ 19:22)  HR: 72 (01-22-18 @ 08:26) (67 - 86)  BP: 115/72 (01-22-18 @ 08:26) (106/67 - 119/74)  RR: 18 (01-22-18 @ 08:26) (17 - 18)  SpO2: 97% (01-22-18 @ 08:26) (95% - 99%)  Wt(kg): --    MEDICATIONS   acetaminophen   Tablet. 650 milliGRAM(s) every 6 hours PRN  aspirin  chewable 81 milliGRAM(s) daily  atorvastatin 80 milliGRAM(s) at bedtime  diazepam    Tablet 5 milliGRAM(s) once  enoxaparin Injectable 40 milliGRAM(s) every 24 hours  lisinopril 5 milliGRAM(s) daily  senna 2 Tablet(s) at bedtime  sodium chloride 0.9%. 1000 milliLiter(s) <Continuous>  zolpidem 5 milliGRAM(s) at bedtime PRN  _________________________________________________________________________________________    PHYSICAL EXAM  Constitutional - NAD, Comfortable  HEENT - NCAT, EOMI  Neck - Supple, No limited ROM  Chest - No distress, symmetrical chest expansion  Cardiovascular -RRR +S1/S2  Abdomen - Soft, NTND  Extremities - No calf tenderness, mild symmetrical LE edema  Neurologic Exam -                    Cognitive - Awake, Alert, AAO to self, place, date, year, situation     Motor - No focal deficits     Sensory - Intact to LT     Intact finger to nose     word finding difficulties improved  Cognitive - 16/30 on MOCA per OT  Psychiatric - Mood stable, Affect WNL    Impression:  71 yo with MRI negative CVA with  cognitive impairments, mild word-finding difficulties and balance impairment.     CVA - ASA and plavix x 3 weeks then ASA alone, lipitor  HTN- lisiniopril  Pain- tylenol PRN  DVT PPX - Lovenox  PT &OT -  ROM, Bed Mob, Transfers, Amb w AD   Prec- Falls, Cardiac  DVT Prophylaxis- Lovenox  Skin- Turn q2   Dispo - Once medically stable, would recommend DC home with homecare and home PT, OT, ST.

## 2018-01-22 NOTE — PROGRESS NOTE ADULT - ASSESSMENT
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 She reports to have significant improvement in her neurological symptoms since her admission to the hospital. MRI brain during hospitalization did not show any evidence of acute infarct. CTA head and neck on admission did not show any significant intracranial or extracranial large vessel severe stenosis or occlusion. Video EEG showed mild generalized background slowing without any evidence of focal slowing or epileptiform discharges. Transthoracic echocardiogram did not show any structural cardiac source of embolism nor showed any evidence of PFO. She is currently undergoing prolonged cardiac monitoring with ICM.     Impression:  Cerebral embolism with/without cerebral infarction. 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 Continue close monitoring for neurologic deterioration, BP management to normotension, titrate statin to LDL goal less than 70, MRI Brain w/o, CTA head w/o and Neck w/contras as noted above, presumptive diagnosis an MRI-negative stroke. Video EEG to r/o differential diagnosis of seizure activity (stroke mimic) as noted above, no seizure activity. Plan LP, off Plavix, to r/o differential diagnosis of encephalitis i.e. Herpes, CMV etc, anticipate tomorrow pending results of P2Y12. PT/PMR re-evaluation to home with home care and home PT/OT.    ANTITHROMBOTIC THERAPY: ASA and clopidogrel for 3 weeks (currently holding clopidogrel in preparation of LP), per CHANCE trial, followed by aspirin. Will resume Plavix after LP tomorrow    PULMONARY: Protecting airway, saturating well      CARDIOVASCULAR: TTE revealed hyperdynamic LV systolic function, mild diastolic dysfunction, stage 1, no PFO, cardiac monitoring without events, s/p ICM placement to assess for occult arrythmia ie: A fib.  Dressing dry and intact. Reviewed with family and patient at bedside how to use bedside monitoring device for ICM, good teach back  BP was elevated started 5mg Lisinopril, will continue to monitor and she will follow up with her PCP as outpatient.                       SBP goal: Gradual normotension    GASTROINTESTINAL: Dysphagia screen passed, tolerating diet     Diet: Regular DASH    RENAL: Good urine output      Na Goal: Greater than 135     Kapoor: No    HEMATOLOGY: States she had mild nosebleed 1/20, no nosebleed since, but off Plavix for LP, will continue to monitor when placed back on dual anti-platelet medication      DVT ppx: Heparin s.c [] LMWH [x]     ID: Afebrile, no leukocytosis, no s/s of infection     DISPOSITION: Home with home care, home PT/OT as per PT and PMR, once workup is complete. Anticipate tomorrow after LP or Wednesday.  Plan was discussed with patient and available family members at bedside in detail. All the questions were answered and concerns were addressed.    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 She reports to have significant improvement in her neurological symptoms since her admission to the hospital. MRI brain during hospitalization did not show any evidence of acute infarct. CTA head and neck on admission did not show any significant intracranial or extracranial large vessel severe stenosis or occlusion. Video EEG showed mild generalized background slowing without any evidence of focal slowing or epileptiform discharges. Transthoracic echocardiogram did not show any structural cardiac source of embolism nor showed any evidence of PFO. She is currently undergoing prolonged cardiac monitoring with ICM.     Impression:  Cerebral embolism with/without cerebral infarction. 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 Continue close monitoring for neurologic deterioration, BP management to normotension, titrate statin to LDL goal less than 70, MRI Brain w/o, CTA head w/o and Neck w/contras as noted above, presumptive diagnosis an MRI-negative stroke. Video EEG to r/o differential diagnosis of seizure activity (stroke mimic) as noted above, no seizure activity. Plan for LP tomorrow, off Plavix, to r/o differential diagnosis of encephalitis i.e. Herpes, CMV etc, anticipate tomorrow pending results of P2Y12. PT/PMR re-evaluation to home with home care and home PT/OT.    ANTITHROMBOTIC THERAPY: ASA and clopidogrel for 3 weeks (currently holding clopidogrel in preparation of LP), per CHANCE trial, followed by aspirinfor secondary stroke prevention. Will resume clopidogrel after LP tomorrow    PULMONARY: Protecting airway, saturating well      CARDIOVASCULAR: TTE revealed hyperdynamic LV systolic function, mild diastolic dysfunction, stage 1, no PFO, cardiac monitoring without events, s/p ICM placement to assess for occult arrythmia i.e.: A fib.  Dressing dry and intact. Reviewed with family and patient at bedside how to use bedside monitoring device for ICM, good teach back  BP was elevated started 5mg Lisinopril, will continue to monitor and she will follow up with her PCP as outpatient.                       SBP goal: Gradual normotension    GASTROINTESTINAL: Dysphagia screen passed, tolerating diet     Diet: Regular DASH    RENAL: Good urine output      Na Goal: Greater than 135     Kapoor: No    HEMATOLOGY: States she had mild nosebleed 1/20, no nosebleed since, but off Plavix for LP, will continue to monitor when placed back on dual anti-platelet medication      DVT ppx: Heparin s.c [] LMWH [x]     ID: Afebrile, no leukocytosis, no s/s of infection     DISPOSITION: Home with home care, home PT/OT as per PT and PMR, once workup is complete. Anticipate tomorrow after LP or Wednesday.  Plan was discussed with patient and available family members at bedside in detail. All the questions were answered and concerns were addressed.    CORE MEASURES:        Admission NIHSS: 3     TPA: [] YES [x] NO      LDL/HDL: 102/72     Depression Screen: 0     Statin Therapy: yes     Dysphagia Screen: [x] PASS [] FAIL     Smoking [] YES [x] NO      Afib [] YES [x] NO     Stroke Education [x] YES [] NO

## 2018-01-22 NOTE — PROGRESS NOTE ADULT - SUBJECTIVE AND OBJECTIVE BOX
THE PATIENT WAS SEEN AND EXAMINED BY ME WITH THE HOUSESTAFF AND STROKE TEAM DURING MORNING ROUNDS.   HPI:  Patient is a 72 year old woman w/ no significant PMHx who presented w/ word-finding difficulty and R facial droop since 10PM the night prior to admission.  She reported that she had been having some word-finding difficulties since the previous evening, however she did not follow up on it and went to bed.  She woke up still having the same symptoms as well as a retro-orbital headache.  Her friend noticed on the phone that the patient was not making sense, and told her daughter about it.  The patient was then brought to the ED for her continued symptoms.  Code stroke called after patient examined in ED, NIHSS 3, MRS 0, tPA not administered as patient out of window, and no intervention due to low NIHSS. Symptoms returned and intensified while in CDU, decision was made to admit pt at that time. No recent fevers, chills, dizziness, changes in vision, weakness, numbness, tingling, CP, SOB, cough, nausea/vomiting, abdominal pain, changes in BMs/urination      SUBJECTIVE: No events overnight.  No new neurologic complaints.      acetaminophen   Tablet. 650 milliGRAM(s) Oral every 6 hours PRN  aspirin  chewable 81 milliGRAM(s) Oral daily  atorvastatin 80 milliGRAM(s) Oral at bedtime  diazepam    Tablet 5 milliGRAM(s) Oral once  enoxaparin Injectable 40 milliGRAM(s) SubCutaneous every 24 hours  lisinopril 5 milliGRAM(s) Oral daily  melatonin 5 milliGRAM(s) Oral at bedtime  senna 2 Tablet(s) Oral at bedtime  sodium chloride 0.9%. 1000 milliLiter(s) IV Continuous <Continuous>  zolpidem 5 milliGRAM(s) Oral at bedtime PRN      PHYSICAL EXAM:   Vital Signs Last 24 Hrs  T(C): 37.1 (22 Jan 2018 12:56), Max: 37.1 (22 Jan 2018 12:56)  T(F): 98.8 (22 Jan 2018 12:56), Max: 98.8 (22 Jan 2018 12:56)  HR: 75 (22 Jan 2018 12:56) (67 - 86)  BP: 114/70 (22 Jan 2018 12:56) (110/67 - 118/68)  BP(mean): --  RR: 18 (22 Jan 2018 12:56) (17 - 18)  SpO2: 97% (22 Jan 2018 12:56) (95% - 97%)    General: No acute distress  HEENT: EOM intact, visual fields full  Abdomen: Soft, nontender, nondistended   Extremities: No edema    NEUROLOGICAL EXAM:  Mental status: Awake, alert, and attentive, speech fluent and prosodic; able to follow all commands, repetition intact, naming intact, no neglect  Cranial Nerves: Subtle right naso-labial flattening, no nystagmus, speech fluent, no dysarthria,  tongue midline, shoulder shrug intact bilaterally.  Motor exam: Normal tone, subtle bilateral pronator drift, 5/5 RUE, 5/5 RLE, 5/5 LUE, 5/5 LLE, normal fine finger movements.  Sensation: Intact to light touch   Coordination/ Gait: No dysmetria, gait without falling to one side, steady, has difficulty with walking on the tip of her toes.    LABS:                        13.4   7.30  )-----------( 287      ( 22 Jan 2018 10:29 )             39.5    01-22    138  |  100  |  20  ----------------------------<  107<H>  3.8   |  25  |  0.60    Ca    9.6      22 Jan 2018 10:29      Hemoglobin A1C, Whole Blood: 5.4 % (01-17 @ 07:32)      IMAGING: Reviewed by me.   CT Brain and CT Angio Head and Neck w/ IV Cont (01.16.18 @ 12:52)   IMPRESSION: Mild atrophy and small vessel white matter ischemic changes.   Normal CTA of the head and neck. No large vessel occlusion. No carotid or   vertebral stenosis in the neck using NASCET criteria.    < from: MR Head No Cont (01.18.18 @ 17:30) >  IMPRESSION: Mild involutional change and microvascular ischemic type   changes. No acute infarct. No evidence of intracranial hemorrhage. Study   is somewhat motion limited as patient could not fully cooperate with the study.    EEG (01.20.18)  Mild diffuse slowing, non-specific mild diffuse or multifocal cerebral dysfunction.  There was   on epileptiform abnormalities recorded, which does not exclude the diagnosis of epilepsy. THE PATIENT WAS SEEN AND EXAMINED BY ME WITH THE HOUSESTAFF AND STROKE TEAM DURING MORNING ROUNDS.     HPI:  Patient is a 72 year old woman w/ no significant PMHx who presented w/ word-finding difficulty and R facial droop since 10PM the night prior to admission.  She reported that she had been having some word-finding difficulties since the previous evening, however she did not follow up on it and went to bed.  She woke up still having the same symptoms as well as a retro-orbital headache.  Her friend noticed on the phone that the patient was not making sense, and told her daughter about it.  The patient was then brought to the ED for her continued symptoms.  Code stroke called after patient examined in ED, NIHSS 3, MRS 0, tPA not administered as patient out of window, and no intervention due to low NIHSS. Symptoms returned and intensified while in CDU, decision was made to admit pt at that time. No recent fevers, chills, dizziness, changes in vision, weakness, numbness, tingling, CP, SOB, cough, nausea/vomiting, abdominal pain, changes in BMs/urination    SUBJECTIVE: No events overnight.  No new neurologic complaints.      acetaminophen   Tablet. 650 milliGRAM(s) Oral every 6 hours PRN  aspirin  chewable 81 milliGRAM(s) Oral daily  atorvastatin 80 milliGRAM(s) Oral at bedtime  diazepam    Tablet 5 milliGRAM(s) Oral once  enoxaparin Injectable 40 milliGRAM(s) SubCutaneous every 24 hours  lisinopril 5 milliGRAM(s) Oral daily  melatonin 5 milliGRAM(s) Oral at bedtime  senna 2 Tablet(s) Oral at bedtime  sodium chloride 0.9%. 1000 milliLiter(s) IV Continuous <Continuous>  zolpidem 5 milliGRAM(s) Oral at bedtime PRN    ROS: All negative except documented above.    PHYSICAL EXAM:   Vital Signs Last 24 Hrs  T(C): 37.1 (22 Jan 2018 12:56), Max: 37.1 (22 Jan 2018 12:56)  T(F): 98.8 (22 Jan 2018 12:56), Max: 98.8 (22 Jan 2018 12:56)  HR: 75 (22 Jan 2018 12:56) (67 - 86)  BP: 114/70 (22 Jan 2018 12:56) (110/67 - 118/68)  BP(mean): --  RR: 18 (22 Jan 2018 12:56) (17 - 18)  SpO2: 97% (22 Jan 2018 12:56) (95% - 97%)    General: No acute distress  HEENT: EOM intact, visual fields full  Abdomen: Soft, nontender, nondistended   Extremities: No edema    NEUROLOGICAL EXAM:  Mental status: Awake, alert, and attentive, speech fluent and prosodic; able to follow all commands, repetition intact, naming intact, no neglect  Cranial Nerves: Subtle right naso-labial flattening, no nystagmus, speech fluent, no dysarthria,  tongue midline, shoulder shrug intact bilaterally.  Motor exam: Normal tone, subtle bilateral pronator drift, 5/5 RUE, 5/5 RLE, 5/5 LUE, 5/5 LLE, normal fine finger movements.  Sensation: Intact to light touch   Coordination/ Gait: No dysmetria, gait without falling to one side, steady, has difficulty with walking on the tip of her toes.    LABS:                        13.4   7.30  )-----------( 287      ( 22 Jan 2018 10:29 )             39.5    01-22    138  |  100  |  20  ----------------------------<  107<H>  3.8   |  25  |  0.60    Ca    9.6      22 Jan 2018 10:29      Hemoglobin A1C, Whole Blood: 5.4 % (01-17 @ 07:32)      IMAGING: Reviewed by me.   CT Brain and CT Angio Head and Neck w/ IV Cont (01.16.18 @ 12:52)   IMPRESSION: Mild atrophy and small vessel white matter ischemic changes.   Normal CTA of the head and neck. No large vessel occlusion. No carotid or   vertebral stenosis in the neck using NASCET criteria.    < from: MR Head No Cont (01.18.18 @ 17:30) >  IMPRESSION: Mild involutional change and microvascular ischemic type   changes. No acute infarct. No evidence of intracranial hemorrhage. Study   is somewhat motion limited as patient could not fully cooperate with the study.    EEG (01.20.18)  Mild diffuse slowing, non-specific mild diffuse or multifocal cerebral dysfunction.  There was   on epileptiform abnormalities recorded, which does not exclude the diagnosis of epilepsy.

## 2018-01-23 LAB
APPEARANCE CSF: CLEAR — SIGNIFICANT CHANGE UP
APPEARANCE SPUN FLD: COLORLESS — SIGNIFICANT CHANGE UP
COLOR CSF: SIGNIFICANT CHANGE UP
CRYPTOC AG CSF-ACNC: NEGATIVE — SIGNIFICANT CHANGE UP
CSF PCR RESULT: SIGNIFICANT CHANGE UP
GLUCOSE CSF-MCNC: 64 MG/DL — SIGNIFICANT CHANGE UP (ref 40–70)
GRAM STN FLD: SIGNIFICANT CHANGE UP
LYMPHOCYTES # CSF: 80 % — SIGNIFICANT CHANGE UP (ref 40–80)
MONOS+MACROS NFR CSF: 20 % — SIGNIFICANT CHANGE UP (ref 15–45)
NEUTROPHILS # CSF: 0 % — SIGNIFICANT CHANGE UP (ref 0–6)
NRBC NFR CSF: 102 /UL — HIGH (ref 0–5)
PA ADP PRP-ACNC: 219 PRU — SIGNIFICANT CHANGE UP (ref 194–417)
PROT CSF-MCNC: 154 MG/DL — HIGH (ref 15–45)
RBC # CSF: 1 /UL — HIGH (ref 0–0)
SPECIMEN SOURCE: SIGNIFICANT CHANGE UP
TUBE TYPE: SIGNIFICANT CHANGE UP

## 2018-01-23 PROCEDURE — 99223 1ST HOSP IP/OBS HIGH 75: CPT | Mod: GC

## 2018-01-23 RX ORDER — ZOLPIDEM TARTRATE 10 MG/1
5 TABLET ORAL AT BEDTIME
Qty: 0 | Refills: 0 | Status: DISCONTINUED | OUTPATIENT
Start: 2018-01-23 | End: 2018-01-24

## 2018-01-23 RX ORDER — ATORVASTATIN CALCIUM 80 MG/1
1 TABLET, FILM COATED ORAL
Qty: 30 | Refills: 0
Start: 2018-01-23 | End: 2018-02-21

## 2018-01-23 RX ORDER — ASPIRIN/CALCIUM CARB/MAGNESIUM 324 MG
1 TABLET ORAL
Qty: 0 | Refills: 0 | DISCHARGE
Start: 2018-01-23

## 2018-01-23 RX ORDER — ASPIRIN/CALCIUM CARB/MAGNESIUM 324 MG
1 TABLET ORAL
Qty: 0 | Refills: 0 | COMMUNITY

## 2018-01-23 RX ORDER — MELOXICAM 15 MG/1
1 TABLET ORAL
Qty: 0 | Refills: 0 | COMMUNITY

## 2018-01-23 RX ORDER — METHOCARBAMOL 500 MG/1
500 TABLET, FILM COATED ORAL ONCE
Qty: 0 | Refills: 0 | Status: COMPLETED | OUTPATIENT
Start: 2018-01-23 | End: 2018-01-23

## 2018-01-23 RX ORDER — LISINOPRIL 2.5 MG/1
1 TABLET ORAL
Qty: 30 | Refills: 0
Start: 2018-01-23 | End: 2018-02-21

## 2018-01-23 RX ORDER — ACETAMINOPHEN 500 MG
2 TABLET ORAL
Qty: 0 | Refills: 0 | DISCHARGE
Start: 2018-01-23

## 2018-01-23 RX ORDER — CLOPIDOGREL BISULFATE 75 MG/1
1 TABLET, FILM COATED ORAL
Qty: 21 | Refills: 0
Start: 2018-01-23 | End: 2018-02-12

## 2018-01-23 RX ADMIN — METHOCARBAMOL 500 MILLIGRAM(S): 500 TABLET, FILM COATED ORAL at 22:17

## 2018-01-23 RX ADMIN — ZOLPIDEM TARTRATE 5 MILLIGRAM(S): 10 TABLET ORAL at 22:17

## 2018-01-23 RX ADMIN — ENOXAPARIN SODIUM 40 MILLIGRAM(S): 100 INJECTION SUBCUTANEOUS at 05:19

## 2018-01-23 RX ADMIN — LISINOPRIL 5 MILLIGRAM(S): 2.5 TABLET ORAL at 05:19

## 2018-01-23 RX ADMIN — Medication 81 MILLIGRAM(S): at 11:50

## 2018-01-23 RX ADMIN — ATORVASTATIN CALCIUM 80 MILLIGRAM(S): 80 TABLET, FILM COATED ORAL at 22:17

## 2018-01-23 NOTE — CONSULT NOTE ADULT - ATTENDING COMMENTS
seen and examined with NP. I agree with [facility].  cryptogenic stroke.  Agree with [facility] to screen for PAF.
72 year old female presented 1/17/18 with word-finding difficulty, nonsensical speech and right facial droop that started the night prior to admission.      In the ED, a stroke code was called with NIHSS 3. tPA not administered due to out of window.     LP was performed due to a negative workup for her symptoms. LP with elevated protein 154 and nucleated cells 102 lymphocyte predominant, normal glucose.     On PE, the patient is awake, alert, oriented x 3, as per daughter at bedside is almost back to baseline.    Assessment:  -Concern for meninigitis with positive LP with lymphocytic pleocytosis/ viral  -Aphasia now resolved    Recommend:  -F/U CSF PCR panel, CSF cx, VDRL , Borrelia CSF, crypt ag  sent by primary team.  -Would continue to monitor off antibiotics.  -If positive for HSV, can start acyclovir.  -Will follow with you.    Stewart Agudelo MD  Pager [phone]  After 5pm/weekends call [phone]

## 2018-01-23 NOTE — CONSULT NOTE ADULT - ASSESSMENT
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 Stroke workup was negative and LP pursued due to a lack of a diagnosis. CSF abnormal- elevated protein 154 and nucleated cells 102 lymphocyte predominant, normal glucose.   Pattern of CSF studies suggests viral process or other inflammatory but clinically nontoxic and does not appear to be meningitis.   Related to distant history of lupus? cerebritis?     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 Stroke workup was negative and LP pursued due to a lack of a diagnosis. CSF abnormal- elevated protein 154 and nucleated cells 102 lymphocyte predominant, normal glucose.   Pattern of CSF studies suggests viral process or other inflammatory but clinically nontoxic and does not appear to be bacterial meningitis.   Related to distant history of lupus? cerebritis?     Recommend  -would monitor off antibiotics and antivirals   -f/u CSF PCR, if positive for HSV would start Acyclovir while maintaining adequate hydration     Discussed with Dr Agudelo and primary team

## 2018-01-23 NOTE — DIETITIAN INITIAL EVALUATION ADULT. - ADHERENCE
n/a/no dietary restrictions PTA however daughter states Pt eats healthy and usually has a great appetite. Per chart NKFA. No micronutrient supplementation PTA.

## 2018-01-23 NOTE — DIETITIAN INITIAL EVALUATION ADULT. - NS AS NUTRI INTERV MEALS SNACK
1) Continue current diet as ordered. 2) Provide food preferences within diet restrictions as feasible./General/healthful diet/Other (specify)

## 2018-01-23 NOTE — CONSULT NOTE ADULT - SUBJECTIVE AND OBJECTIVE BOX
HPI:  72F presented 1/17/18 with word-finding difficulty, nonsensical speech and right facial droop. Throughout her hospital stay her stroke workup has been negative including MRI, CTA head neck, echocardiogram. She improved over the course of a few days and is now back to normal, wanting to go home.   ID consulted because an LP was performed because of a lack of a diagnosis from prior tests. CSF was found to be abnormal with elevated protein 154 and nucleated cells 102 lymphocyte predominant, normal glucose.   She feels well, has not had fevers or chills, headaches, photophobia. Her neck is a little stiff from staying in bed all the time but has full range of motion and can raise her knees towards her chest without limitation. No recent sick contacts.   Her only medical history was SLE in the form of a discoid lesion on her forehead more than twenty years ago. She has not been on any medications for lupus or had complications from it.     PAST MEDICAL & SURGICAL HISTORY:  No pertinent past medical history  H/O total hysterectomy  History of appendectomy    Allergies  penicillin (Rash)    Intolerances    ANTIMICROBIALS:      OTHER MEDS:  acetaminophen   Tablet. 650 milliGRAM(s) Oral every 6 hours PRN  aspirin  chewable 81 milliGRAM(s) Oral daily  atorvastatin 80 milliGRAM(s) Oral at bedtime  enoxaparin Injectable 40 milliGRAM(s) SubCutaneous every 24 hours  lisinopril 5 milliGRAM(s) Oral daily  senna 2 Tablet(s) Oral at bedtime  sodium chloride 0.9%. 1000 milliLiter(s) IV Continuous <Continuous>  zolpidem 5 milliGRAM(s) Oral at bedtime PRN      SOCIAL HISTORY: Lives alone. Non smoker.     FAMILY HISTORY:  No pertinent family history in first degree relatives    Drug Dosing Weight  Height (cm): 157.48 (17 Jan 2018 02:13)  Weight (kg): 63.3 (16 Jan 2018 13:10)  BMI (kg/m2): 25.5 (17 Jan 2018 02:13)  BSA (m2): 1.64 (17 Jan 2018 02:13)    PE:    Vital Signs Last 24 Hrs  T(C): 36.8 (23 Jan 2018 16:24), Max: 37.1 (23 Jan 2018 09:25)  T(F): 98.2 (23 Jan 2018 16:24), Max: 98.8 (23 Jan 2018 11:45)  HR: 82 (23 Jan 2018 16:24) (68 - 90)  BP: 97/61 (23 Jan 2018 16:24) (97/61 - 118/63)  BP(mean): --  RR: 18 (23 Jan 2018 16:24) (18 - 18)  SpO2: 97% (23 Jan 2018 16:24) (94% - 97%)    Gen: AOx3, NAD, non-toxic, pleasant  HEENT: normocephalic, no gross oral lesions, no herpetic lesions over lips, conjunctiva clear, no LAD   CV: S1+S2 normal, no murmurs, nontachycardic  Resp: Clear bilat, no resp distress, no crackles/wheezes  Abd: Soft, nontender, +BS  Ext: No LE edema, no wounds  MSK: no nuchal rigidity, negative Kernig's sign   : No Kapoor  IV/Skin: No thrombophlebitis  Psych: normal affect  Neuro: nonfocal     LABS:                          13.4   7.30  )-----------( 287      ( 22 Jan 2018 10:29 )             39.5       01-22    138  |  100  |  20  ----------------------------<  107<H>  3.8   |  25  |  0.60    Ca    9.6      22 Jan 2018 10:29            MICROBIOLOGY:  .CSF CSF  01-23-18 --  --    No polymorphonuclear cells seen  No organisms seen  by cytocentrifuge      RADIOLOGY:  MR Head No Cont (01.18.18 @ 17:30)   Mild involutional change and microvascular ischemic type   changes. No acute infarct. No evidence of intracranial hemorrhage. Study   is somewhat motion limited as patient could not fully cooperate with the   study. No change compared with 1/17/2018 HPI:  72F presented 1/17/18 with word-finding difficulty, nonsensical speech and right facial droop that started the night prior to admission.  She developed word-finding difficulties and went to bed. She woke up with the same symptoms as well as headache. No modifying factors. Her friend noticed on the phone and she was brought to the ED where a stroke code was called with NIHSS 3. tPA not administered due to out of window.     Throughout her hospital stay her stroke workup has been negative including MRI, CTA head neck, echocardiogram. She improved over the course of a few days and is now back to normal, requesting to go home.     ID consulted because an LP was performed because of a lack of a diagnosis from prior tests. CSF was found to be abnormal with elevated protein 154 and nucleated cells 102 lymphocyte predominant, normal glucose.   She feels well, has not had fevers or chills, headaches, photophobia. Her neck is a little stiff from staying in bed all the time but has full range of motion and can raise her knees towards her chest without limitation. No recent sick contacts.     Her only medical history was SLE in the form of a discoid lesion on her forehead more than twenty years ago. She has not been on any medications for lupus or had complications from it.       PAST MEDICAL & SURGICAL HISTORY:  No pertinent past medical history  H/O total hysterectomy  History of appendectomy    Allergies  penicillin (Rash)    Intolerances    ANTIMICROBIALS:      OTHER MEDS:  acetaminophen   Tablet. 650 milliGRAM(s) Oral every 6 hours PRN  aspirin  chewable 81 milliGRAM(s) Oral daily  atorvastatin 80 milliGRAM(s) Oral at bedtime  enoxaparin Injectable 40 milliGRAM(s) SubCutaneous every 24 hours  lisinopril 5 milliGRAM(s) Oral daily  senna 2 Tablet(s) Oral at bedtime  sodium chloride 0.9%. 1000 milliLiter(s) IV Continuous <Continuous>  zolpidem 5 milliGRAM(s) Oral at bedtime PRN      SOCIAL HISTORY: Lives alone. Non smoker.     FAMILY HISTORY:  No pertinent family history in first degree relatives    Drug Dosing Weight  Height (cm): 157.48 (17 Jan 2018 02:13)  Weight (kg): 63.3 (16 Jan 2018 13:10)  BMI (kg/m2): 25.5 (17 Jan 2018 02:13)  BSA (m2): 1.64 (17 Jan 2018 02:13)    PE:    Vital Signs Last 24 Hrs  T(C): 36.8 (23 Jan 2018 16:24), Max: 37.1 (23 Jan 2018 09:25)  T(F): 98.2 (23 Jan 2018 16:24), Max: 98.8 (23 Jan 2018 11:45)  HR: 82 (23 Jan 2018 16:24) (68 - 90)  BP: 97/61 (23 Jan 2018 16:24) (97/61 - 118/63)  BP(mean): --  RR: 18 (23 Jan 2018 16:24) (18 - 18)  SpO2: 97% (23 Jan 2018 16:24) (94% - 97%)    Gen: AOx3, NAD, non-toxic, pleasant  HEENT: normocephalic, no gross oral lesions, no herpetic lesions over lips, conjunctiva clear, no LAD   CV: S1+S2 normal, no murmurs, nontachycardic  Resp: Clear bilat, no resp distress, no crackles/wheezes  Abd: Soft, nontender, +BS  Ext: No LE edema, no wounds  MSK: no nuchal rigidity, negative Kernig's sign   : No Kapoor  IV/Skin: No thrombophlebitis  Psych: normal affect  Neuro: nonfocal     LABS:                          13.4   7.30  )-----------( 287      ( 22 Jan 2018 10:29 )             39.5       01-22    138  |  100  |  20  ----------------------------<  107<H>  3.8   |  25  |  0.60    Ca    9.6      22 Jan 2018 10:29      MICROBIOLOGY:  .CSF CSF  01-23-18 --  --    No polymorphonuclear cells seen  No organisms seen  by cytocentrifuge      RADIOLOGY:  MR Head No Cont (01.18.18 @ 17:30)   Mild involutional change and microvascular ischemic type   changes. No acute infarct. No evidence of intracranial hemorrhage. Study   is somewhat motion limited as patient could not fully cooperate with the   study. No change compared with 1/17/2018

## 2018-01-23 NOTE — PROGRESS NOTE ADULT - ASSESSMENT
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 She reports to have significant improvement in her neurological symptoms since her admission to the hospital. MRI brain during hospitalization did not show any evidence of acute infarct. CTA head and neck on admission did not show any significant intracranial or extracranial large vessel severe stenosis or occlusion. Video EEG showed mild generalized background slowing without any evidence of focal slowing or epileptiform discharges. Transthoracic echocardiogram did not show any structural cardiac source of embolism nor showed any evidence of PFO. She is currently undergoing prolonged cardiac monitoring with ICM.     Impression:  Cerebral embolism with/without cerebral infarction. 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 BP management to normotension, titrate statin to LDL goal less than 70, MRI Brain w/o, CTA head w/o and Neck w/contras as noted above, presumptive diagnosis an MRI-negative stroke. Video EEG to r/o differential diagnosis of seizure activity (stroke mimic) as noted above, no seizure activity. Plan for LP , off Plavix, to r/o differential diagnosis of encephalitis i.e. Herpes, CMV etc, P2Y12 219. PT/PMR re-evaluation to home with home care and home PT/OT.    ANTITHROMBOTIC THERAPY: ASA and clopidogrel for 3 weeks (currently holding clopidogrel in preparation of LP), per CHANCE trial, followed by aspirin for secondary stroke prevention. Will resume clopidogrel after LP 1/24/18    PULMONARY: Protecting airway, saturating well      CARDIOVASCULAR: TTE revealed hyperdynamic LV systolic function, mild diastolic dysfunction, stage 1, no PFO, cardiac monitoring without events, s/p ICM placement to assess for occult arrythmia i.e.: A fib.  Dressing dry and intact. Reviewed with family and patient at bedside how to use bedside monitoring device for ICM, good teach back  BP was elevated started 5mg Lisinopril, will continue to monitor and she will follow up with her PCP as outpatient.                       SBP goal: Gradual normotension    GASTROINTESTINAL: Dysphagia screen passed, tolerating diet     Diet: Regular DASH    RENAL: Good urine output      Na Goal: Greater than 135     Kapoor: No    HEMATOLOGY: States she had mild nosebleed 1/20, no nosebleed since,  off Plavix for LP, will continue to monitor when placed back on dual anti-platelet medication with PCP.     DVT ppx: Heparin s.c [] LMWH [x]     ID: Afebrile, no leukocytosis, no s/s of infection     DISPOSITION: Home with home care, home PT/OT as per PT and PMR, once workup is complete. Anticipate tomorrow after LP or Wednesday.  Plan was discussed with patient and  family member at bedside in detail. All the questions were answered and concerns were addressed.    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 She reports to have significant improvement in her neurological symptoms since her admission to the hospital. MRI brain during hospitalization did not show any evidence of acute infarct. CTA head and neck on admission did not show any significant intracranial or extracranial large vessel severe stenosis or occlusion. Video EEG showed mild generalized background slowing without any evidence of focal slowing or epileptiform discharges. Transthoracic echocardiogram did not show any structural cardiac source of embolism nor showed any evidence of PFO. She is currently undergoing prolonged cardiac monitoring with ICM.     Impression:  Cerebral embolism with/without cerebral infarction. 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 BP management to normotension, titrate statin to LDL goal less than 70, MRI Brain w/o, CTA head w/o and neck w/contras as noted above, presumptive diagnosis an MRI-negative stroke. Video EEG to r/o differential diagnosis of seizure activity (stroke mimic) as noted above, no seizure activity. Plan for LP today, off Plavix, to r/o differential diagnosis of encephalitis i.e. Herpes, CMV etc, P2Y12 219. PT/PMR re-evaluation to home with home care and home PT/OT.    ANTITHROMBOTIC THERAPY: ASA and clopidogrel for 3 weeks (currently holding clopidogrel in preparation of LP), per CHANCE trial, followed by aspirin for secondary stroke prevention. Will resume clopidogrel after LP 1/24/18    PULMONARY: Protecting airway, saturating well      CARDIOVASCULAR: TTE revealed hyperdynamic LV systolic function, mild diastolic dysfunction, stage 1, no PFO, cardiac monitoring without events, s/p ICM placement to assess for occult arrythmia i.e.: A fib.  Dressing dry and intact. Reviewed with family and patient at bedside how to use bedside monitoring device for ICM, good teach back  BP was elevated started 5mg Lisinopril, will continue to monitor and she will follow up with her PCP as outpatient.                       SBP goal: Gradual normotension    GASTROINTESTINAL: Dysphagia screen passed, tolerating diet     Diet: Regular DASH    RENAL: Good urine output      Na Goal: Greater than 135     Kapoor: No    HEMATOLOGY: States she had mild nosebleed 1/20, no nosebleed since,  off Plavix for LP, will continue to monitor when placed back on dual anti-platelet medication with PCP.     DVT ppx: Heparin s.c [] LMWH [x]     ID: Afebrile, no leukocytosis, no si/sx of infection     DISPOSITION: Home with home care, home PT/OT as per PT and PMR, once workup is complete. Anticipate based on LP results. Plan was discussed with patient and  family member at bedside in detail. All the questions were answered and concerns were addressed.    CORE MEASURES:        Admission NIHSS: 3     TPA: [] YES [x] NO      LDL/HDL: 102/72     Depression Screen: 0     Statin Therapy: yes     Dysphagia Screen: [x] PASS [] FAIL     Smoking [] YES [x] NO      Afib [] YES [x] NO     Stroke Education [x] YES [] NO

## 2018-01-23 NOTE — DIETITIAN INITIAL EVALUATION ADULT. - OTHER INFO
Pt seen for length of stay. Pt seen for length of stay. Pt seen laying in bed, daughter at bedside states Pt recently took a valium therefore could not participate in interview. Per daughter Pt eats well PTA without any changes in her weight PTA. The first few days of admission Pt was not eating much but was also very confused/disoriented. States Pt enjoyed the lamb chops for dinner last night and she bought her a salad for lunch. Daughter states the Pt is leaving today and declines any nutrition intervention at this time (education, health shakes, food preferences). No note of GI distress. No difficulty chewing/swallowing.

## 2018-01-23 NOTE — PROGRESS NOTE ADULT - SUBJECTIVE AND OBJECTIVE BOX
THE PATIENT WAS SEEN AND EXAMINED BY ME WITH THE HOUSESTAFF AND STROKE TEAM DURING MORNING ROUNDS.   HPI:  Patient is a 72 year old woman w/ no significant PMHx who presented w/ word-finding difficulty and R facial droop since 10PM the night prior to admission.  She reported that she had been having some word-finding difficulties since the previous evening, however she did not follow up on it and went to bed.  She woke up still having the same symptoms as well as a retro-orbital headache.  Her friend noticed on the phone that the patient was not making sense, and told her daughter about it.  The patient was then brought to the ED for her continued symptoms.  Code stroke called after patient examined in ED, NIHSS 3, MRS 0, tPA not administered as patient out of window, and no intervention due to low NIHSS. Symptoms returned and intensified while in CDU, decision was made to admit pt at that time. No recent fevers, chills, dizziness, changes in vision, weakness, numbness, tingling, CP, SOB, cough, nausea/vomiting, abdominal pain, changes in BMs/urination    SUBJECTIVE: No events overnight.  No new neurologic complaints.      acetaminophen   Tablet. 650 milliGRAM(s) Oral every 6 hours PRN  aspirin  chewable 81 milliGRAM(s) Oral daily  atorvastatin 80 milliGRAM(s) Oral at bedtime  diazepam    Tablet 5 milliGRAM(s) Oral once  enoxaparin Injectable 40 milliGRAM(s) SubCutaneous every 24 hours  lisinopril 5 milliGRAM(s) Oral daily  melatonin 5 milliGRAM(s) Oral at bedtime  senna 2 Tablet(s) Oral at bedtime  sodium chloride 0.9%. 1000 milliLiter(s) IV Continuous <Continuous>  zolpidem 5 milliGRAM(s) Oral at bedtime PRN      PHYSICAL EXAM:   Vital Signs Last 24 Hrs  T(C): 36.8 (23 Jan 2018 05:22), Max: 37.1 (22 Jan 2018 12:56)  T(F): 98.2 (23 Jan 2018 05:22), Max: 98.8 (22 Jan 2018 12:56)  HR: 68 (23 Jan 2018 05:22) (68 - 90)  BP: 118/63 (23 Jan 2018 05:22) (107/65 - 118/63)  BP(mean): --  RR: 18 (23 Jan 2018 05:22) (18 - 18)  SpO2: 96% (23 Jan 2018 05:22) (94% - 97%)    General: No acute distress  HEENT: EOM intact, visual fields full  Abdomen: Soft, nontender, nondistended   Extremities: No edema    NEUROLOGICAL EXAM:  Mental status: Awake, alert, and attentive, speech fluent and prosodic; able to follow all commands, repetition intact, naming intact, no neglect  Cranial Nerves: Subtle right naso-labial flattening, no nystagmus, speech fluent, no dysarthria,  tongue midline, shoulder shrug intact bilaterally.  Motor exam: Normal tone, subtle bilateral pronator drift, 5/5 RUE, 5/5 RLE, 5/5 LUE, 5/5 LLE, normal fine finger movements.  Sensation: Intact to light touch   Coordination/ Gait: No dysmetria, gait without falling to one side, steady, has difficulty with walking on the tip of her toes.  LABS:                        13.4   7.30  )-----------( 287      ( 22 Jan 2018 10:29 )             39.5    01-22    138  |  100  |  20  ----------------------------<  107<H>  3.8   |  25  |  0.60    Ca    9.6      22 Jan 2018 10:29      Hemoglobin A1C, Whole Blood: 5.4 % (01-17 @ 07:32)      IMAGING: Reviewed by me.   CT Brain /CT Angio Head / Neck w/ IV Cont (01.16.18)   IMPRESSION: Mild atrophy and small vessel white matter ischemic changes.   Normal CTA of the head and neck. No large vessel occlusion. No carotid or   vertebral stenosis in the neck using NASCET criteria.     MR Head No Cont (01.18.18)   IMPRESSION: Mild involutional change and microvascular ischemic type   changes. No acute infarct. No evidence of intracranial hemorrhage. Study   is somewhat motion limited as patient could not fully cooperate with the study.    EEG (01.20.18)  Mild diffuse slowing, non-specific mild diffuse or multifocal cerebral dysfunction.  There was   on epileptiform abnormalities recorded, which does not exclude the diagnosis of epilepsy. THE PATIENT WAS SEEN AND EXAMINED BY ME WITH THE HOUSESTAFF AND STROKE TEAM DURING MORNING ROUNDS.   HPI:  Patient is a 72 year old woman w/ no significant PMHx who presented w/ word-finding difficulty and R facial droop since 10PM the night prior to admission.  She reported that she had been having some word-finding difficulties since the previous evening, however she did not follow up on it and went to bed.  She woke up still having the same symptoms as well as a retro-orbital headache.  Her friend noticed on the phone that the patient was not making sense, and told her daughter about it.  The patient was then brought to the ED for her continued symptoms.  Code stroke called after patient examined in ED, NIHSS 3, MRS 0, tPA not administered as patient out of window, and no intervention due to low NIHSS. Symptoms returned and intensified while in CDU, decision was made to admit pt at that time. No recent fevers, chills, dizziness, changes in vision, weakness, numbness, tingling, CP, SOB, cough, nausea/vomiting, abdominal pain, changes in BMs/urination    SUBJECTIVE: No events overnight.  No new neurologic complaints.      acetaminophen   Tablet. 650 milliGRAM(s) Oral every 6 hours PRN  aspirin  chewable 81 milliGRAM(s) Oral daily  atorvastatin 80 milliGRAM(s) Oral at bedtime  diazepam    Tablet 5 milliGRAM(s) Oral once  enoxaparin Injectable 40 milliGRAM(s) SubCutaneous every 24 hours  lisinopril 5 milliGRAM(s) Oral daily  melatonin 5 milliGRAM(s) Oral at bedtime  senna 2 Tablet(s) Oral at bedtime  sodium chloride 0.9%. 1000 milliLiter(s) IV Continuous <Continuous>  zolpidem 5 milliGRAM(s) Oral at bedtime PRN      PHYSICAL EXAM:   Vital Signs Last 24 Hrs  T(C): 36.8 (23 Jan 2018 05:22), Max: 37.1 (22 Jan 2018 12:56)  T(F): 98.2 (23 Jan 2018 05:22), Max: 98.8 (22 Jan 2018 12:56)  HR: 68 (23 Jan 2018 05:22) (68 - 90)  BP: 118/63 (23 Jan 2018 05:22) (107/65 - 118/63)  BP(mean): --  RR: 18 (23 Jan 2018 05:22) (18 - 18)  SpO2: 96% (23 Jan 2018 05:22) (94% - 97%)    General: No acute distress  HEENT: EOM intact, visual fields full  Abdomen: Soft, nontender, nondistended   Extremities: No edema    NEUROLOGICAL EXAM:  Mental status: Awake, alert, and attentive, speech fluent and prosodic; able to follow all commands, repetition intact, naming intact, no neglect  Cranial Nerves: Subtle right naso-labial flattening, no nystagmus, speech fluent, no dysarthria,  tongue midline, shoulder shrug intact bilaterally.  Motor exam: Normal tone, subtle bilateral pronator drift, 5/5 RUE, 5/5 RLE, 5/5 LUE, 5/5 LLE, normal fine finger movements.  Sensation: Intact to light touch   Coordination/ Gait: No dysmetria      LABS:                        13.4   7.30  )-----------( 287      ( 22 Jan 2018 10:29 )             39.5    01-22    138  |  100  |  20  ----------------------------<  107<H>  3.8   |  25  |  0.60    Ca    9.6      22 Jan 2018 10:29      Hemoglobin A1C, Whole Blood: 5.4 % (01-17 @ 07:32)      IMAGING: Reviewed by me.   CT Brain /CT Angio Head / Neck w/ IV Cont (01.16.18)   IMPRESSION: Mild atrophy and small vessel white matter ischemic changes.   Normal CTA of the head and neck. No large vessel occlusion. No carotid or   vertebral stenosis in the neck using NASCET criteria.     MR Head No Cont (01.18.18)   IMPRESSION: Mild involutional change and microvascular ischemic type   changes. No acute infarct. No evidence of intracranial hemorrhage. Study   is somewhat motion limited as patient could not fully cooperate with the study.    EEG (01.20.18)  Mild diffuse slowing, non-specific mild diffuse or multifocal cerebral dysfunction.  There was   on epileptiform abnormalities recorded, which does not exclude the diagnosis of epilepsy. THE PATIENT WAS SEEN AND EXAMINED BY ME WITH THE HOUSESTAFF AND STROKE TEAM DURING MORNING ROUNDS.     HPI:  Patient is a 72 year old woman w/ no significant PMHx who presented w/ word-finding difficulty and R facial droop since 10PM the night prior to admission.  She reported that she had been having some word-finding difficulties since the previous evening, however she did not follow up on it and went to bed.  She woke up still having the same symptoms as well as a retro-orbital headache.  Her friend noticed on the phone that the patient was not making sense, and told her daughter about it.  The patient was then brought to the ED for her continued symptoms.  Code stroke called after patient examined in ED, NIHSS 3, MRS 0, tPA not administered as patient out of window, and no intervention due to low NIHSS. Symptoms returned and intensified while in CDU, decision was made to admit pt at that time. No recent fevers, chills, dizziness, changes in vision, weakness, numbness, tingling, CP, SOB, cough, nausea/vomiting, abdominal pain, changes in BMs/urination    SUBJECTIVE: No events overnight.  No new neurologic complaints.      acetaminophen   Tablet. 650 milliGRAM(s) Oral every 6 hours PRN  aspirin  chewable 81 milliGRAM(s) Oral daily  atorvastatin 80 milliGRAM(s) Oral at bedtime  diazepam    Tablet 5 milliGRAM(s) Oral once  enoxaparin Injectable 40 milliGRAM(s) SubCutaneous every 24 hours  lisinopril 5 milliGRAM(s) Oral daily  melatonin 5 milliGRAM(s) Oral at bedtime  senna 2 Tablet(s) Oral at bedtime  sodium chloride 0.9%. 1000 milliLiter(s) IV Continuous <Continuous>  zolpidem 5 milliGRAM(s) Oral at bedtime PRN    RS: All negative except documented above.    PHYSICAL EXAM:   Vital Signs Last 24 Hrs  T(C): 36.8 (23 Jan 2018 05:22), Max: 37.1 (22 Jan 2018 12:56)  T(F): 98.2 (23 Jan 2018 05:22), Max: 98.8 (22 Jan 2018 12:56)  HR: 68 (23 Jan 2018 05:22) (68 - 90)  BP: 118/63 (23 Jan 2018 05:22) (107/65 - 118/63)  BP(mean): --  RR: 18 (23 Jan 2018 05:22) (18 - 18)  SpO2: 96% (23 Jan 2018 05:22) (94% - 97%)    General: No acute distress  HEENT: EOM intact, visual fields full  Abdomen: Soft, nontender, nondistended   Extremities: No edema    NEUROLOGICAL EXAM:  Mental status: Awake, alert, and attentive, speech fluent and prosodic; able to follow all commands, repetition intact, naming intact, no neglect  Cranial Nerves: Subtle right naso-labial flattening, no nystagmus, speech fluent, no dysarthria,  tongue midline, shoulder shrug intact bilaterally.  Motor exam: Normal tone, subtle bilateral pronator drift, 5/5 RUE, 5/5 RLE, 5/5 LUE, 5/5 LLE, normal fine finger movements.  Sensation: Intact to light touch   Coordination/ Gait: No dysmetria      LABS:                        13.4   7.30  )-----------( 287      ( 22 Jan 2018 10:29 )             39.5    01-22    138  |  100  |  20  ----------------------------<  107<H>  3.8   |  25  |  0.60    Ca    9.6      22 Jan 2018 10:29      Hemoglobin A1C, Whole Blood: 5.4 % (01-17 @ 07:32)      IMAGING: Reviewed by me.   CT Brain /CT Angio Head / Neck w/ IV Cont (01.16.18)   IMPRESSION: Mild atrophy and small vessel white matter ischemic changes.   Normal CTA of the head and neck. No large vessel occlusion. No carotid or   vertebral stenosis in the neck using NASCET criteria.     MR Head No Cont (01.18.18)   IMPRESSION: Mild involutional change and microvascular ischemic type   changes. No acute infarct. No evidence of intracranial hemorrhage. Study   is somewhat motion limited as patient could not fully cooperate with the study.    EEG (01.20.18)  Mild diffuse slowing, non-specific mild diffuse or multifocal cerebral dysfunction.  There was   on epileptiform abnormalities recorded, which does not exclude the diagnosis of epilepsy.

## 2018-01-23 NOTE — DIETITIAN INITIAL EVALUATION ADULT. - SOURCE
other (specify)/comprehensive chart review comprehensive chart review, daughter at bedside/other (specify)/family/significant other

## 2018-01-23 NOTE — DIETITIAN INITIAL EVALUATION ADULT. - ENERGY NEEDS
ht: 62 inches, dosing wt: 139.5 pounds, BMI: 25.5 kg/m2, IBW: 110 pounds (+/- 10%),  %IBW  Edema: none noted. Skin: intact.  Other pertinent information: 71 y/o female presented c nonsensical speech and R facial droop. "MRI brain during hospitalization did not show any evidence of acute infarct. CTA head and neck on admission did not show any significant intracranial or extracranial large vessel severe stenosis or occlusion. Probable non-MR image left MCA distribution stroke. She is currently undergoing prolonged cardiac monitoring with ICM. "

## 2018-01-24 VITALS
RESPIRATION RATE: 18 BRPM | OXYGEN SATURATION: 98 % | TEMPERATURE: 98 F | HEART RATE: 73 BPM | SYSTOLIC BLOOD PRESSURE: 105 MMHG | DIASTOLIC BLOOD PRESSURE: 58 MMHG

## 2018-01-24 LAB
T3FREE SERPL-MCNC: 2.57 PG/ML — SIGNIFICANT CHANGE UP (ref 1.8–4.6)
T4 FREE SERPL-MCNC: 1.5 NG/DL — SIGNIFICANT CHANGE UP (ref 0.9–1.8)
TSH SERPL-MCNC: 1.83 UIU/ML — SIGNIFICANT CHANGE UP (ref 0.27–4.2)

## 2018-01-24 PROCEDURE — 83036 HEMOGLOBIN GLYCOSYLATED A1C: CPT

## 2018-01-24 PROCEDURE — 90686 IIV4 VACC NO PRSV 0.5 ML IM: CPT

## 2018-01-24 PROCEDURE — 33282: CPT

## 2018-01-24 PROCEDURE — 70450 CT HEAD/BRAIN W/O DYE: CPT

## 2018-01-24 PROCEDURE — 97165 OT EVAL LOW COMPLEX 30 MIN: CPT

## 2018-01-24 PROCEDURE — 96374 THER/PROPH/DIAG INJ IV PUSH: CPT | Mod: XU

## 2018-01-24 PROCEDURE — 84157 ASSAY OF PROTEIN OTHER: CPT

## 2018-01-24 PROCEDURE — 89051 BODY FLUID CELL COUNT: CPT

## 2018-01-24 PROCEDURE — 81001 URINALYSIS AUTO W/SCOPE: CPT

## 2018-01-24 PROCEDURE — 80061 LIPID PANEL: CPT

## 2018-01-24 PROCEDURE — 87102 FUNGUS ISOLATION CULTURE: CPT

## 2018-01-24 PROCEDURE — 70551 MRI BRAIN STEM W/O DYE: CPT

## 2018-01-24 PROCEDURE — 87476 LYME DIS DNA AMP PROBE: CPT

## 2018-01-24 PROCEDURE — 82553 CREATINE MB FRACTION: CPT

## 2018-01-24 PROCEDURE — 84484 ASSAY OF TROPONIN QUANT: CPT

## 2018-01-24 PROCEDURE — 97161 PT EVAL LOW COMPLEX 20 MIN: CPT

## 2018-01-24 PROCEDURE — 70498 CT ANGIOGRAPHY NECK: CPT

## 2018-01-24 PROCEDURE — C1764: CPT

## 2018-01-24 PROCEDURE — 86403 PARTICLE AGGLUT ANTBDY SCRN: CPT

## 2018-01-24 PROCEDURE — 82550 ASSAY OF CK (CPK): CPT

## 2018-01-24 PROCEDURE — 95951: CPT

## 2018-01-24 PROCEDURE — 99285 EMERGENCY DEPT VISIT HI MDM: CPT | Mod: 25

## 2018-01-24 PROCEDURE — 80053 COMPREHEN METABOLIC PANEL: CPT

## 2018-01-24 PROCEDURE — 87070 CULTURE OTHR SPECIMN AEROBIC: CPT

## 2018-01-24 PROCEDURE — 99232 SBSQ HOSP IP/OBS MODERATE 35: CPT

## 2018-01-24 PROCEDURE — 97116 GAIT TRAINING THERAPY: CPT

## 2018-01-24 PROCEDURE — 85027 COMPLETE CBC AUTOMATED: CPT

## 2018-01-24 PROCEDURE — 84439 ASSAY OF FREE THYROXINE: CPT

## 2018-01-24 PROCEDURE — 82945 GLUCOSE OTHER FLUID: CPT

## 2018-01-24 PROCEDURE — 84481 FREE ASSAY (FT-3): CPT

## 2018-01-24 PROCEDURE — 85610 PROTHROMBIN TIME: CPT

## 2018-01-24 PROCEDURE — 80048 BASIC METABOLIC PNL TOTAL CA: CPT

## 2018-01-24 PROCEDURE — 85730 THROMBOPLASTIN TIME PARTIAL: CPT

## 2018-01-24 PROCEDURE — 85576 BLOOD PLATELET AGGREGATION: CPT

## 2018-01-24 PROCEDURE — 87483 CNS DNA AMP PROBE TYPE 12-25: CPT

## 2018-01-24 PROCEDURE — 93306 TTE W/DOPPLER COMPLETE: CPT

## 2018-01-24 PROCEDURE — 84443 ASSAY THYROID STIM HORMONE: CPT

## 2018-01-24 PROCEDURE — 95819 EEG AWAKE AND ASLEEP: CPT

## 2018-01-24 PROCEDURE — 97530 THERAPEUTIC ACTIVITIES: CPT

## 2018-01-24 PROCEDURE — 87205 SMEAR GRAM STAIN: CPT

## 2018-01-24 PROCEDURE — 82962 GLUCOSE BLOOD TEST: CPT

## 2018-01-24 PROCEDURE — 70496 CT ANGIOGRAPHY HEAD: CPT

## 2018-01-24 PROCEDURE — 86592 SYPHILIS TEST NON-TREP QUAL: CPT

## 2018-01-24 PROCEDURE — G0515: CPT

## 2018-01-24 PROCEDURE — 92523 SPEECH SOUND LANG COMPREHEN: CPT

## 2018-01-24 RX ADMIN — ENOXAPARIN SODIUM 40 MILLIGRAM(S): 100 INJECTION SUBCUTANEOUS at 05:32

## 2018-01-24 RX ADMIN — Medication 650 MILLIGRAM(S): at 05:32

## 2018-01-24 RX ADMIN — Medication 81 MILLIGRAM(S): at 10:56

## 2018-01-24 RX ADMIN — Medication 650 MILLIGRAM(S): at 06:02

## 2018-01-24 RX ADMIN — LISINOPRIL 5 MILLIGRAM(S): 2.5 TABLET ORAL at 05:32

## 2018-01-24 NOTE — PROGRESS NOTE ADULT - ASSESSMENT
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 She reports to have significant improvement in her neurological symptoms since her admission to the hospital. MRI brain during hospitalization did not show any evidence of acute infarct. CTA head and neck on admission did not show any significant intracranial or extracranial large vessel severe stenosis or occlusion. Video EEG showed mild generalized background slowing without any evidence of focal slowing or epileptiform discharges. Transthoracic echocardiogram did not show any structural cardiac source of embolism nor showed any evidence of PFO. She is currently undergoing prolonged cardiac monitoring with ICM vs encephalopathy  (? viral aseptic meningitis).     Impression:  Cerebral embolism with/without cerebral infarction. Probable non-MR image left MCA distribution stroke - likely etiology being cryptogenic embolism, probably related to embolism from a proximal source like cardiac source of embolism      NEURO: Neurologically with fluency of speech back to her baseline vs admission, aphasia now resolved. BP management to normotension, titrate statin to LDL goal less than 70, MRI Brain w/o, CTA head w/o and neck w/contras as noted above, presumptive diagnosis an MRI-negative stroke. Video EEG to r/o differential diagnosis of seizure activity (stroke mimic) as noted above, no seizure activity. S/P LP PCR negative, restart Plavix,  PT/PMR re-evaluation to home with home care and home PT/OT.    ANTITHROMBOTIC THERAPY: ASA and clopidogrel for 3 weeks per CHANCE trial, followed by aspirin for secondary stroke prevention.      PULMONARY: Protecting airway, saturating well      CARDIOVASCULAR: TTE revealed hyperdynamic LV systolic function, mild diastolic dysfunction, stage 1, no PFO, cardiac monitoring without events, s/p ICM placement to assess for occult arrythmia i.e.: A fib.  Dressing dry and intact. Reviewed with family and patient at bedside how to use bedside monitoring device for ICM, good teach back  BP was elevated started 5mg Lisinopril, will continue to monitor and she will follow up with her PCP as outpatient.                       SBP goal: Gradual normotension    GASTROINTESTINAL: Dysphagia screen passed, tolerating diet     Diet: Regular DASH    RENAL: Good urine output      Na Goal: Greater than 135     Kapoor: No    HEMATOLOGY: States she had mild nosebleed 1/20, no nosebleed since, recommend outpatient follow up and monitoring     DVT ppx: Heparin s.c [] LMWH [x]     ID: Afebrile,  no si/sx of infection , LP with 102 nucleated cells, 1 RBC, cultures in progress, PCR negative, cryptococcal negative, ID consult appreciated: aseptic meningitis ? viral, follow up in ID clinic in 2-4 weeks. [id number]    DISPOSITION: Home with home care, home PT/OT as per PT and PMR, as workup is complete.  Plan was discussed with patient and  family member at bedside in detail. All the questions were answered and concerns were addressed.    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 She reports to have significant improvement in her neurological symptoms since her admission to the hospital. MRI brain during hospitalization did not show any evidence of acute infarct. CTA head and neck on admission did not show any significant intracranial or extracranial large vessel severe stenosis or occlusion. Video EEG showed mild generalized background slowing without any evidence of focal slowing or epileptiform discharges. Transthoracic echocardiogram did not show any structural cardiac source of embolism nor showed any evidence of PFO. She is currently undergoing prolonged cardiac monitoring with ICM. CSF examination showed high nucleated cells, predominantly lymphocytes and high-protein.     Impression:  Cerebral embolism with/without cerebral infarction. Probable non-MR image left MCA distribution stroke - likely etiology being cryptogenic embolism, probably related to embolism from a proximal source like cardiac source of embolism versus non-herpes viral encephalitis    NEURO: Neurologically with fluency of speech back to her baseline vs admission, aphasia now resolved. BP management to normotension, titrate statin to LDL goal less than 70, MRI Brain w/o, CTA head w/o and neck w/contras as noted above, presumptive diagnosis an MRI-negative stroke. Video EEG to r/o differential diagnosis of seizure activity (stroke mimic) as noted above, no seizure activity. S/P LP PCR negative, restart Plavix, PT/PMR re-evaluation to home with home care and home PT/OT.    ANTITHROMBOTIC THERAPY: ASA and clopidogrel for 3 weeks per CHANCE trial, followed by aspirin for secondary stroke prevention.      PULMONARY: Protecting airway, saturating well      CARDIOVASCULAR: TTE revealed hyperdynamic LV systolic function, mild diastolic dysfunction, stage 1, no PFO, cardiac monitoring without events, s/p ICM placement to assess for occult arrythmia i.e.: A fib.  Dressing dry and intact. Reviewed with family and patient at bedside how to use bedside monitoring device for ICM, good teach back  BP was elevated started 5mg Lisinopril, will continue to monitor and she will follow up with her PCP as outpatient.                       SBP goal: Gradual normotension    GASTROINTESTINAL: Dysphagia screen passed, tolerating diet     Diet: Regular DASH    RENAL: Good urine output      Na Goal: Greater than 135     Kapoor: No    HEMATOLOGY: States she had mild nose-bleed 1/20, no nose-bleed since, recommend outpatient follow up and monitoring     DVT ppx: Heparin s.c [] LMWH [x]     ID: Afebrile, no si/sx of infection, LP with 102 nucleated cells, 1 RBC, cultures in progress, PCR negative, cryptococcal negative, ID consult appreciated: aseptic meningitis ? viral, follow up in ID clinic in 2-4 weeks. [phone]    DISPOSITION: Home with home care, home PT/OT as per PT and PMR, as workup is complete.  Plan was discussed with patient and  family member at bedside in detail. All the questions were answered and concerns were addressed.    CORE MEASURES:        Admission NIHSS: 3     TPA: [] YES [x] NO      LDL/HDL: 102/72     Depression Screen: 0     Statin Therapy: yes     Dysphagia Screen: [x] PASS [] FAIL     Smoking [] YES [x] NO      Afib [] YES [x] NO     Stroke Education [x] YES [] NO

## 2018-01-24 NOTE — PROGRESS NOTE ADULT - NSHPATTENDINGPLANDISCUSS_GEN_ALL_CORE
Neurology resident
neurology resident and NP on stroke service
NP on stroke service
neurology resident and PA on stroke service
Neurology residents and PA on stroke service
NP on stroke service
neurology residents and NP
neurology resident and NP

## 2018-01-24 NOTE — PROGRESS NOTE ADULT - SUBJECTIVE AND OBJECTIVE BOX
CC: F/U aseptic meningitis    Interval History/ROS: Patient feels well today. Has no complaints. Denies N/V/D/C, fever, chills, chest pain, sob, abd pain, cough, no reported neurological deficits.    Allergies  penicillin (Rash)      ANTIMICROBIALS:      OTHER MEDS:  acetaminophen   Tablet. 650 milliGRAM(s) Oral every 6 hours PRN  aspirin  chewable 81 milliGRAM(s) Oral daily  atorvastatin 80 milliGRAM(s) Oral at bedtime  enoxaparin Injectable 40 milliGRAM(s) SubCutaneous every 24 hours  lisinopril 5 milliGRAM(s) Oral daily  senna 2 Tablet(s) Oral at bedtime  sodium chloride 0.9%. 1000 milliLiter(s) IV Continuous <Continuous>  zolpidem 5 milliGRAM(s) Oral at bedtime PRN      PE:    Vital Signs Last 24 Hrs  T(C): 36.7 (24 Jan 2018 08:07), Max: 37.1 (23 Jan 2018 11:45)  T(F): 98.1 (24 Jan 2018 08:07), Max: 98.8 (23 Jan 2018 11:45)  HR: 73 (24 Jan 2018 08:07) (73 - 93)  BP: 105/58 (24 Jan 2018 08:07) (97/61 - 127/70)  BP(mean): --  RR: 18 (24 Jan 2018 08:07) (18 - 18)  SpO2: 98% (24 Jan 2018 08:07) (97% - 99%)    Gen: AOx3, NAD, non-toxic, pleasant  CV: S1+S2 normal, no murmurs  Resp: Clear bilat, no resp distress  Abd: Soft, nontender, +BS  Ext: No LE edema, no wounds  : No Kapoor  IV/Skin: No thrombophlebitis  Neuro: no focal deficits    LABS:    MICROBIOLOGY:  v  .CSF CSF  01-23-18   Testing in progress  --    No polymorphonuclear cells seen  No organisms seen  by cytocentrifuge    RADIOLOGY:    No new images.

## 2018-01-24 NOTE — PROGRESS NOTE ADULT - ATTENDING COMMENTS
72 year old female presented 1/17/18 with word-finding difficulty, nonsensical speech and right facial droop that started the night prior to admission.    In the ED, a stroke code was called with NIHSS 3. tPA not administered due to out of window.     LP was performed due to a negative workup for her symptoms. LP with elevated protein 154 and nucleated cells 102 lymphocyte predominant, normal glucose. CSF PCR negative.     On PE, the patient is awake, alert, oriented x 3, as per daughter at bedside is almost back to baseline.    Assessment:  -Aseptic meningitis most likely viral (possibly drug induced as patient was on "Vanquish" for a cold since dec, however, LP was lymphocytic predominant)  -Aphasia now resolved  -Afebrile  -Nontoxic  -WBC WNL    Recommend:  -Would continue to monitor off antibiotics. Continue supportive care.  -Patient can followup with me in ID clinic in 2-4 weeks [phone] for appointments.    Stewart Agudelo MD  Pager [phone]  After 5pm/weekends call [phone]
agree with above; ROS otherwise negative
seen and examined with Dr. Santos.  Patient doing well; ok to D/C home with family and home PT/OT
agree with above; ROS otherwise negative

## 2018-01-24 NOTE — PROGRESS NOTE ADULT - SUBJECTIVE AND OBJECTIVE BOX
THE PATIENT WAS SEEN AND EXAMINED BY ME WITH THE HOUSESTAFF AND STROKE TEAM DURING MORNING ROUNDS.   HPI:  Patient is a 72 year old woman w/ no significant PMHx who presented w/ word-finding difficulty and R facial droop since 10PM the night prior to admission.  She reported that she had been having some word-finding difficulties since the previous evening, however she did not follow up on it and went to bed.  She woke up still having the same symptoms as well as a retro-orbital headache.  Her friend noticed on the phone that the patient was not making sense, and told her daughter about it.  The patient was then brought to the ED for her continued symptoms.  Code stroke called after patient examined in ED, NIHSS 3, MRS 0, tPA not administered as patient out of window, and no intervention due to low NIHSS. Symptoms returned and intensified while in CDU, decision was made to admit pt at that time. No recent fevers, chills, dizziness, changes in vision, weakness, numbness, tingling, CP, SOB, cough, nausea/vomiting, abdominal pain, changes in BMs/urination        SUBJECTIVE: No events overnight.  No new neurologic complaints.      acetaminophen   Tablet. 650 milliGRAM(s) Oral every 6 hours PRN  aspirin  chewable 81 milliGRAM(s) Oral daily  atorvastatin 80 milliGRAM(s) Oral at bedtime  enoxaparin Injectable 40 milliGRAM(s) SubCutaneous every 24 hours  lisinopril 5 milliGRAM(s) Oral daily  senna 2 Tablet(s) Oral at bedtime  sodium chloride 0.9%. 1000 milliLiter(s) IV Continuous <Continuous>  zolpidem 5 milliGRAM(s) Oral at bedtime PRN      PHYSICAL EXAM:   Vital Signs Last 24 Hrs  T(C): 36.7 (24 Jan 2018 08:07), Max: 36.9 (24 Jan 2018 03:55)  T(F): 98.1 (24 Jan 2018 08:07), Max: 98.4 (24 Jan 2018 03:55)  HR: 73 (24 Jan 2018 08:07) (73 - 93)  BP: 105/58 (24 Jan 2018 08:07) (97/61 - 127/70)  BP(mean): --  RR: 18 (24 Jan 2018 08:07) (18 - 18)  SpO2: 98% (24 Jan 2018 08:07) (97% - 99%)    General: No acute distress  HEENT: EOM intact, visual fields full  Abdomen: Soft, nontender, nondistended   Extremities: No edema    NEUROLOGICAL EXAM:  Mental status: Awake, alert, and attentive, speech fluent and prosodic; able to follow all commands, repetition intact, naming intact, no neglect  Cranial Nerves: Subtle right naso-labial flattening, no nystagmus, speech fluent, no dysarthria,  tongue midline, shoulder shrug intact bilaterally.  Motor exam: Normal tone, subtle bilateral pronator drift, 5/5 RUE, 5/5 RLE, 5/5 LUE, 5/5 LLE, normal fine finger movements.  Sensation: Intact to light touch   Coordination/ Gait: No dysmetria    LABS:         Hemoglobin A1C, Whole Blood: 5.4 % (01-17 @ 07:32)      IMAGING: Reviewed by me.     CT Brain /CT Angio Head / Neck w/ IV Cont (01.16.18)   IMPRESSION: Mild atrophy and small vessel white matter ischemic changes.   Normal CTA of the head and neck. No large vessel occlusion. No carotid or   vertebral stenosis in the neck using NASCET criteria.     MR Head No Cont (01.18.18)   IMPRESSION: Mild involutional change and microvascular ischemic type   changes. No acute infarct. No evidence of intracranial hemorrhage. Study   is somewhat motion limited as patient could not fully cooperate with the study.    EEG (01.20.18)  Mild diffuse slowing, non-specific mild diffuse or multifocal cerebral dysfunction.  There was   on epileptiform abnormalities recorded, which does not exclude the diagnosis of epilepsy. THE PATIENT WAS SEEN AND EXAMINED BY ME WITH THE HOUSESTAFF AND STROKE TEAM DURING MORNING ROUNDS.     HPI:  Patient is a 72 year old woman w/ no significant PMHx who presented w/ word-finding difficulty and R facial droop since 10PM the night prior to admission.  She reported that she had been having some word-finding difficulties since the previous evening, however she did not follow up on it and went to bed.  She woke up still having the same symptoms as well as a retro-orbital headache.  Her friend noticed on the phone that the patient was not making sense, and told her daughter about it.  The patient was then brought to the ED for her continued symptoms.  Code stroke called after patient examined in ED, NIHSS 3, MRS 0, tPA not administered as patient out of window, and no intervention due to low NIHSS. Symptoms returned and intensified while in CDU, decision was made to admit pt at that time. No recent fevers, chills, dizziness, changes in vision, weakness, numbness, tingling, CP, SOB, cough, nausea/vomiting, abdominal pain, changes in BMs/urination    SUBJECTIVE: No events overnight.  No new neurologic complaints.      acetaminophen   Tablet. 650 milliGRAM(s) Oral every 6 hours PRN  aspirin  chewable 81 milliGRAM(s) Oral daily  atorvastatin 80 milliGRAM(s) Oral at bedtime  enoxaparin Injectable 40 milliGRAM(s) SubCutaneous every 24 hours  lisinopril 5 milliGRAM(s) Oral daily  senna 2 Tablet(s) Oral at bedtime  sodium chloride 0.9%. 1000 milliLiter(s) IV Continuous <Continuous>  zolpidem 5 milliGRAM(s) Oral at bedtime PRN    ROS: All negative except documented above.     PHYSICAL EXAM:   Vital Signs Last 24 Hrs  T(C): 36.7 (24 Jan 2018 08:07), Max: 36.9 (24 Jan 2018 03:55)  T(F): 98.1 (24 Jan 2018 08:07), Max: 98.4 (24 Jan 2018 03:55)  HR: 73 (24 Jan 2018 08:07) (73 - 93)  BP: 105/58 (24 Jan 2018 08:07) (97/61 - 127/70)  BP(mean): --  RR: 18 (24 Jan 2018 08:07) (18 - 18)  SpO2: 98% (24 Jan 2018 08:07) (97% - 99%)    General: No acute distress  HEENT: EOM intact, visual fields full  Abdomen: Soft, nontender, nondistended   Extremities: No edema    NEUROLOGICAL EXAM:  Mental status: Awake, alert, and attentive, speech fluent and prosodic; able to follow all commands, repetition intact, naming intact, no neglect  Cranial Nerves: Subtle right naso-labial flattening, no nystagmus, speech fluent, no dysarthria,  tongue midline, shoulder shrug intact bilaterally.  Motor exam: Normal tone, subtle bilateral pronator drift, 5/5 RUE, 5/5 RLE, 5/5 LUE, 5/5 LLE, normal fine finger movements.  Sensation: Intact to light touch   Coordination/ Gait: No dysmetria    LABS:         Hemoglobin A1C, Whole Blood: 5.4 % (01-17 @ 07:32)      IMAGING: Reviewed by me.     CT Brain /CT Angio Head / Neck w/ IV Cont (01.16.18)   IMPRESSION: Mild atrophy and small vessel white matter ischemic changes.   Normal CTA of the head and neck. No large vessel occlusion. No carotid or   vertebral stenosis in the neck using NASCET criteria.     MR Head No Cont (01.18.18)   IMPRESSION: Mild involutional change and microvascular ischemic type   changes. No acute infarct. No evidence of intracranial hemorrhage. Study   is somewhat motion limited as patient could not fully cooperate with the study.    EEG (01.20.18)  Mild diffuse slowing, non-specific mild diffuse or multifocal cerebral dysfunction.  There was   on epileptiform abnormalities recorded, which does not exclude the diagnosis of epilepsy.

## 2018-01-25 LAB — VDRL CSF-TITR: NEGATIVE — SIGNIFICANT CHANGE UP

## 2018-01-26 LAB
B BURGDOR DNA SPEC QL NAA+PROBE: NEGATIVE — SIGNIFICANT CHANGE UP
CULTURE RESULTS: NO GROWTH — SIGNIFICANT CHANGE UP
SPECIMEN SOURCE: SIGNIFICANT CHANGE UP

## 2018-01-29 ENCOUNTER — APPOINTMENT (OUTPATIENT)
Dept: NEUROLOGY | Facility: CLINIC | Age: 73
End: 2018-01-29
Payer: MEDICARE

## 2018-01-29 VITALS — HEART RATE: 86 BPM | DIASTOLIC BLOOD PRESSURE: 70 MMHG | SYSTOLIC BLOOD PRESSURE: 121 MMHG

## 2018-01-29 DIAGNOSIS — Z87.39 PERSONAL HISTORY OF OTHER DISEASES OF THE MUSCULOSKELETAL SYSTEM AND CONNECTIVE TISSUE: ICD-10-CM

## 2018-01-29 DIAGNOSIS — Z80.1 FAMILY HISTORY OF MALIGNANT NEOPLASM OF TRACHEA, BRONCHUS AND LUNG: ICD-10-CM

## 2018-01-29 DIAGNOSIS — Z78.9 OTHER SPECIFIED HEALTH STATUS: ICD-10-CM

## 2018-01-29 DIAGNOSIS — Z80.3 FAMILY HISTORY OF MALIGNANT NEOPLASM OF BREAST: ICD-10-CM

## 2018-01-29 DIAGNOSIS — Z82.49 FAMILY HISTORY OF ISCHEMIC HEART DISEASE AND OTHER DISEASES OF THE CIRCULATORY SYSTEM: ICD-10-CM

## 2018-01-29 DIAGNOSIS — Z82.3 FAMILY HISTORY OF STROKE: ICD-10-CM

## 2018-01-29 PROCEDURE — 99215 OFFICE O/P EST HI 40 MIN: CPT

## 2018-01-29 RX ORDER — ASPIRIN 81 MG
81 TABLET, DELAYED RELEASE (ENTERIC COATED) ORAL
Refills: 0 | Status: ACTIVE | COMMUNITY

## 2018-02-02 ENCOUNTER — APPOINTMENT (OUTPATIENT)
Dept: ELECTROPHYSIOLOGY | Facility: CLINIC | Age: 73
End: 2018-02-02
Payer: MEDICARE

## 2018-02-02 ENCOUNTER — NON-APPOINTMENT (OUTPATIENT)
Age: 73
End: 2018-02-02

## 2018-02-02 VITALS
OXYGEN SATURATION: 98 % | HEART RATE: 88 BPM | DIASTOLIC BLOOD PRESSURE: 80 MMHG | HEIGHT: 62 IN | WEIGHT: 130 LBS | BODY MASS INDEX: 23.92 KG/M2 | SYSTOLIC BLOOD PRESSURE: 146 MMHG

## 2018-02-02 PROCEDURE — 99024 POSTOP FOLLOW-UP VISIT: CPT

## 2018-02-14 RX ORDER — ATORVASTATIN CALCIUM 80 MG/1
80 TABLET, FILM COATED ORAL
Qty: 30 | Refills: 3 | Status: ACTIVE | COMMUNITY
Start: 2018-02-14 | End: 1900-01-01

## 2018-02-21 LAB
CULTURE RESULTS: SIGNIFICANT CHANGE UP
SPECIMEN SOURCE: SIGNIFICANT CHANGE UP

## 2018-02-26 ENCOUNTER — APPOINTMENT (OUTPATIENT)
Dept: INTERNAL MEDICINE | Facility: CLINIC | Age: 73
End: 2018-02-26
Payer: MEDICARE

## 2018-02-26 VITALS
HEIGHT: 62 IN | DIASTOLIC BLOOD PRESSURE: 78 MMHG | WEIGHT: 133 LBS | OXYGEN SATURATION: 99 % | TEMPERATURE: 97.9 F | BODY MASS INDEX: 24.48 KG/M2 | RESPIRATION RATE: 16 BRPM | SYSTOLIC BLOOD PRESSURE: 147 MMHG | HEART RATE: 89 BPM

## 2018-02-26 PROCEDURE — 99204 OFFICE O/P NEW MOD 45 MIN: CPT

## 2018-02-26 RX ORDER — CLOPIDOGREL BISULFATE 75 MG/1
75 TABLET, FILM COATED ORAL DAILY
Qty: 7 | Refills: 0 | Status: DISCONTINUED | COMMUNITY
Start: 2018-02-14 | End: 2018-02-26

## 2018-02-26 RX ORDER — ATORVASTATIN CALCIUM 80 MG/1
80 TABLET, FILM COATED ORAL
Refills: 0 | Status: DISCONTINUED | COMMUNITY
End: 2018-02-26

## 2018-02-26 RX ORDER — CLOPIDOGREL 75 MG/1
75 TABLET, FILM COATED ORAL
Refills: 0 | Status: DISCONTINUED | COMMUNITY
End: 2018-02-26

## 2018-02-26 RX ORDER — SENNOSIDES 8.6 MG/1
8.6 CAPSULE, GELATIN COATED ORAL
Refills: 0 | Status: DISCONTINUED | COMMUNITY
End: 2018-02-26

## 2018-03-05 ENCOUNTER — TRANSCRIPTION ENCOUNTER (OUTPATIENT)
Age: 73
End: 2018-03-05

## 2018-03-05 ENCOUNTER — APPOINTMENT (OUTPATIENT)
Dept: NEUROLOGY | Facility: CLINIC | Age: 73
End: 2018-03-05
Payer: MEDICARE

## 2018-03-05 VITALS
HEIGHT: 62 IN | SYSTOLIC BLOOD PRESSURE: 126 MMHG | HEART RATE: 77 BPM | WEIGHT: 132 LBS | DIASTOLIC BLOOD PRESSURE: 64 MMHG | BODY MASS INDEX: 24.29 KG/M2

## 2018-03-05 PROCEDURE — 99215 OFFICE O/P EST HI 40 MIN: CPT

## 2018-03-06 ENCOUNTER — APPOINTMENT (OUTPATIENT)
Dept: ELECTROPHYSIOLOGY | Facility: CLINIC | Age: 73
End: 2018-03-06
Payer: MEDICARE

## 2018-03-06 PROCEDURE — 93298 REM INTERROG DEV EVAL SCRMS: CPT

## 2018-03-09 RX ORDER — LISINOPRIL 5 MG/1
5 TABLET ORAL DAILY
Qty: 15 | Refills: 0 | Status: ACTIVE | COMMUNITY
Start: 1900-01-01 | End: 1900-01-01

## 2018-03-23 ENCOUNTER — APPOINTMENT (OUTPATIENT)
Dept: MRI IMAGING | Facility: CLINIC | Age: 73
End: 2018-03-23

## 2018-04-10 ENCOUNTER — APPOINTMENT (OUTPATIENT)
Dept: ELECTROPHYSIOLOGY | Facility: CLINIC | Age: 73
End: 2018-04-10
Payer: MEDICARE

## 2018-04-10 PROCEDURE — 93298 REM INTERROG DEV EVAL SCRMS: CPT

## 2018-05-11 ENCOUNTER — APPOINTMENT (OUTPATIENT)
Dept: ELECTROPHYSIOLOGY | Facility: CLINIC | Age: 73
End: 2018-05-11

## 2018-05-23 ENCOUNTER — APPOINTMENT (OUTPATIENT)
Dept: ELECTROPHYSIOLOGY | Facility: CLINIC | Age: 73
End: 2018-05-23

## 2018-06-18 ENCOUNTER — APPOINTMENT (OUTPATIENT)
Dept: NEUROLOGY | Facility: CLINIC | Age: 73
End: 2018-06-18
Payer: MEDICARE

## 2018-06-18 VITALS
DIASTOLIC BLOOD PRESSURE: 73 MMHG | SYSTOLIC BLOOD PRESSURE: 125 MMHG | BODY MASS INDEX: 24.29 KG/M2 | HEART RATE: 76 BPM | HEIGHT: 62 IN | WEIGHT: 132 LBS

## 2018-06-18 DIAGNOSIS — I63.412 CEREBRAL INFARCTION DUE TO EMBOLISM OF LEFT MIDDLE CEREBRAL ARTERY: ICD-10-CM

## 2018-06-18 PROCEDURE — 93880 EXTRACRANIAL BILAT STUDY: CPT

## 2018-06-18 PROCEDURE — 93886 INTRACRANIAL COMPLETE STUDY: CPT

## 2018-06-18 PROCEDURE — 99215 OFFICE O/P EST HI 40 MIN: CPT

## 2018-06-18 PROCEDURE — 93892 TCD EMBOLI DETECT W/O INJ: CPT

## 2019-03-18 ENCOUNTER — APPOINTMENT (OUTPATIENT)
Dept: CT IMAGING | Facility: CLINIC | Age: 74
End: 2019-03-18
Payer: MEDICARE

## 2019-03-18 ENCOUNTER — OUTPATIENT (OUTPATIENT)
Dept: OUTPATIENT SERVICES | Facility: HOSPITAL | Age: 74
LOS: 1 days | End: 2019-03-18
Payer: MEDICARE

## 2019-03-18 DIAGNOSIS — Z90.49 ACQUIRED ABSENCE OF OTHER SPECIFIED PARTS OF DIGESTIVE TRACT: Chronic | ICD-10-CM

## 2019-03-18 DIAGNOSIS — Z90.710 ACQUIRED ABSENCE OF BOTH CERVIX AND UTERUS: Chronic | ICD-10-CM

## 2019-03-18 DIAGNOSIS — R91.8 OTHER NONSPECIFIC ABNORMAL FINDING OF LUNG FIELD: ICD-10-CM

## 2019-03-18 PROCEDURE — 71250 CT THORAX DX C-: CPT | Mod: 26

## 2019-03-18 PROCEDURE — 71250 CT THORAX DX C-: CPT

## 2020-02-25 NOTE — ED CDU PROVIDER DISPOSITION NOTE - NS ED MD DISPO ISOLATION TYPES
JF  Please advise in FS's absence  Please see MyChart message  Had order for MRI but no contrast and at [facility]  Started new order for MRI with contrast  Triage can fax to [facility] when completed   Thank you,  Tracy Arambula, RN     None

## 2020-06-19 NOTE — OCCUPATIONAL THERAPY INITIAL EVALUATION ADULT - PHYSICAL ASSIST/NONPHYSICAL ASSIST: STAND/SIT, REHAB EVAL
verbal cues/supervision V-Y Plasty Text: The defect edges were debeveled with a #15c scalpel blade.  Given the location of the defect, shape of the defect and the proximity to free margins an V-Y advancement flap was deemed most appropriate.  Using a sterile surgical marker, an appropriate advancement flap was drawn incorporating the defect and placing the expected incisions within the relaxed skin tension lines where possible.    The area thus outlined was incised deep to adipose tissue with a #15 scalpel blade.  The skin margins were undermined to an appropriate distance in all directions utilizing iris scissors.

## 2020-12-15 ENCOUNTER — TRANSCRIPTION ENCOUNTER (OUTPATIENT)
Age: 75
End: 2020-12-15

## 2021-02-21 NOTE — DISCHARGE NOTE ADULT - PERSONAL RISK FACTORS
face/eye lid
ICH requiring EVD, NSG at bedside will admit for further management.
Statement Selected

## 2021-10-19 NOTE — ED CDU PROVIDER INITIAL DAY NOTE - CRANIAL NERVE AND PUPILLARY EXAM
Discharge planning issues
R/O Transaminitis
cranial nerves 2-12 intact/extra-ocular movements intact

## 2022-02-10 ENCOUNTER — NON-APPOINTMENT (OUTPATIENT)
Age: 77
End: 2022-02-10

## 2022-02-14 ENCOUNTER — OUTPATIENT (OUTPATIENT)
Dept: OUTPATIENT SERVICES | Facility: HOSPITAL | Age: 77
LOS: 1 days | End: 2022-02-14
Payer: MEDICARE

## 2022-02-14 VITALS
DIASTOLIC BLOOD PRESSURE: 74 MMHG | HEART RATE: 74 BPM | OXYGEN SATURATION: 96 % | SYSTOLIC BLOOD PRESSURE: 164 MMHG | RESPIRATION RATE: 16 BRPM | HEIGHT: 62 IN | WEIGHT: 134.92 LBS | TEMPERATURE: 98 F

## 2022-02-14 VITALS
HEART RATE: 67 BPM | RESPIRATION RATE: 18 BRPM | DIASTOLIC BLOOD PRESSURE: 78 MMHG | SYSTOLIC BLOOD PRESSURE: 149 MMHG | OXYGEN SATURATION: 96 %

## 2022-02-14 DIAGNOSIS — Z45.010 ENCOUNTER FOR CHECKING AND TESTING OF CARDIAC PACEMAKER PULSE GENERATOR [BATTERY]: ICD-10-CM

## 2022-02-14 DIAGNOSIS — Z90.710 ACQUIRED ABSENCE OF BOTH CERVIX AND UTERUS: Chronic | ICD-10-CM

## 2022-02-14 DIAGNOSIS — Z90.49 ACQUIRED ABSENCE OF OTHER SPECIFIED PARTS OF DIGESTIVE TRACT: Chronic | ICD-10-CM

## 2022-02-14 PROCEDURE — 33286 RMVL SUBQ CAR RHYTHM MNTR: CPT

## 2022-02-14 RX ORDER — ATORVASTATIN CALCIUM 80 MG/1
1 TABLET, FILM COATED ORAL
Qty: 0 | Refills: 0 | DISCHARGE

## 2022-02-14 RX ORDER — SENNA PLUS 8.6 MG/1
1 TABLET ORAL
Qty: 0 | Refills: 0 | DISCHARGE

## 2022-02-14 RX ORDER — ROSUVASTATIN CALCIUM 5 MG/1
1 TABLET ORAL
Qty: 0 | Refills: 0 | DISCHARGE

## 2022-02-14 NOTE — H&P CARDIOLOGY - NSICDXFAMILYHX_GEN_ALL_CORE_FT
FAMILY HISTORY:  Father  Still living? No  FH: heart disease, Age at diagnosis: Age Unknown    Mother  Still living? No  Family history of breast cancer, Age at diagnosis: Age Unknown    Grandparent  Still living? No  Family history of stroke, Age at diagnosis: Age Unknown

## 2022-02-14 NOTE — ASU DISCHARGE PLAN (ADULT/PEDIATRIC) - ASU DC SPECIAL INSTRUCTIONSFT
WOUND CARE:  Do NOT scrub, rub, scratch or pick your incision site  the glue will wear off in 5-7 days  do not get your incision wet for 3 days   AFTER 3 days you may shower:   - use mild soap and gentle warm stream, pat dry with clean towel  DO NOT apply lotions, powder, ointment, or perfumes to incision site  wear loose clothing around incision for 7 days  f/u with your cardiologist or PCP appointment as instructed     ACTIVITY:   resume normal activities    Follow heart healthy diet recommended by your doctor, if you smoke STOP SMOKING ( may call [phone] for center of tobacco control if you need assistance)     ***CALL YOUR DOCTOR***  if you experience: fever, chills, body aches, or severe pain, swelling, redness, heat or yellow discharge from incision site  If you are unable to reach your doctor, you may contact Cardiology Office at [facility] at [phone]

## 2022-02-14 NOTE — H&P CARDIOLOGY - HISTORY OF PRESENT ILLNESS
76 year old female with PMHx of HTN, HLD, ?encephalopathy vs TIA (word-finding difficulty and R facial droop in 2018), ILR insertion at that time and discharged, now presents for ILR extraction. Pt denies chest pain, dizziness or palpitation.    76 year old female with PMHx of HTN, HLD, ?encephalopathy vs TIA (word-finding difficulty and R facial droop in 2018), ILR insertion at that time and discharged, now presents for ILR extraction. Pt denies having difficulty of finding words, chest pain, dizziness or palpitation.

## 2022-02-14 NOTE — ASU DISCHARGE PLAN (ADULT/PEDIATRIC) - CARE PROVIDER_API CALL
Russell Isabel)  Cardiac Electrophysiology; Cardiology  [address] [place] [address]  Phone: [phone]  Fax: [phone]  Scheduled Appointment: 02/24/2022 09:40 AM

## 2022-02-14 NOTE — ASU DISCHARGE PLAN (ADULT/PEDIATRIC) - NS MD DC FALL RISK RISK
For information on Fall & Injury Prevention, visit: [email or web address] OR  [email or web address] OR  https://www.cdc.gov/steadi/patient.html

## 2022-02-16 ENCOUNTER — NON-APPOINTMENT (OUTPATIENT)
Age: 77
End: 2022-02-16

## 2022-02-24 ENCOUNTER — APPOINTMENT (OUTPATIENT)
Dept: ELECTROPHYSIOLOGY | Facility: CLINIC | Age: 77
End: 2022-02-24
Payer: MEDICARE

## 2022-02-24 ENCOUNTER — NON-APPOINTMENT (OUTPATIENT)
Age: 77
End: 2022-02-24

## 2022-02-24 VITALS
OXYGEN SATURATION: 96 % | WEIGHT: 135 LBS | HEIGHT: 62 IN | DIASTOLIC BLOOD PRESSURE: 82 MMHG | BODY MASS INDEX: 24.84 KG/M2 | HEART RATE: 71 BPM | SYSTOLIC BLOOD PRESSURE: 137 MMHG

## 2022-02-24 PROCEDURE — 99212 OFFICE O/P EST SF 10 MIN: CPT

## 2022-02-24 PROCEDURE — 93000 ELECTROCARDIOGRAM COMPLETE: CPT

## 2022-02-24 NOTE — HISTORY OF PRESENT ILLNESS
[FreeTextEntry1] : Ms. Sheldon is a 75 y/o female with pmhx of HLD, HTN, CVA s/p ILR implant in 2018 for AF monitoring. The device had recently reached EOS and she is now s/p ILR explant on 02/14/2022. She presents to the device clinic today for a wound check. Pt denies fever, chills, or any signs of infection. Overall, she is feeling well without any cardiac symptoms.

## 2022-02-24 NOTE — ASSESSMENT
[FreeTextEntry1] : 77 y/o female with pmhx of CVA s/p ILR implant in 2018 and now ILR explant on 02/14/2022. Wound appears to be healing well without any drainage, erythema, or swelling. Post-operative instructions reviewed and all questions answered. She is clear from EP standpoint and will continue to follow with her PCP and neurologist.

## 2022-02-24 NOTE — PHYSICAL EXAM
[Normal] : alert and oriented, normal memory [de-identified] : Left sternal border surgical incision appears to be well approximated and healing well. No drainage, swelling, or erythema.

## 2023-01-24 NOTE — PROGRESS NOTE ADULT - PROVIDER SPECIALTY LIST ADULT
Electrophysiology
Electrophysiology
Neurology
Rehab Medicine
Infectious Disease
Neurology
Yes

## 2024-01-01 NOTE — ED ADULT TRIAGE NOTE - HEART RATE (BEATS/MIN)
Acute posthemorrhage anemia  a.  Note of Hct drop from 20 to 15 today  b.  Transfuse pRBC   c.  Repeat Hct    GI bleed  a.  Start PPI  b.  Appreciate GI input  c.  Will hold off on CTA at this time    Postprocedural hypotension/ hemorrhagenic shock  a.  Drop of SBP [id number] to 115  b.  Hold anti-htn at this time  c.  Trend lactate    Coagulopathy  a.  On ASA, Brillanta  b.  Discuss with vascular re: temporary cessation vs downgrade to ASA at this time  c.  Check p2Y12    MABLE  a.  Judicious fluid resuscitation at this time  b.  Creatinine remains stable at 1.7    At risk for malnutrition  a.  Tolerating diet  b.  Make NPO at this time  c.  NGT in place 92 Acute posthemorrhage anemia  a.  Note of Hct drop from 20 to 15 today  b.  Transfuse pRBC   c.  Repeat Hct    GI bleed  a.  Start PPI  b.  Appreciate GI input  c.  Will hold off on CTA at this time    Postprocedural hypotension/ hemorrhagenic shock  a.  Drop of SBP [id number] to 115  b.  Hold anti-htn at this time  c.  Trend lactate    Coagulopathy  a.  On ASA, Brillanta  b.  Discuss with vascular re: temporary cessation vs downgrade to ASA at this time  c.  Check p2Y12    MABLE  a.  Judicious fluid resuscitation at this time  b.  Creatinine remains stable at 1.7    At risk for malnutrition  a.  Tolerating diet  b.  Make NPO at this time  c.  NGT in place

## 2024-10-15 NOTE — ASU DISCHARGE PLAN (ADULT/PEDIATRIC) - CONDITION AT DISCHARGE
----- Message from Dinorah sent at 10/15/2024  8:22 AM CDT -----  Regarding: RX  Patient needs his blood pressure called in he will be out by the weekend. Please call him at [phone]  
Forwarded refill request to [facility]  
Stable

## 2024-11-15 ENCOUNTER — EMERGENCY (EMERGENCY)
Facility: HOSPITAL | Age: 79
LOS: 1 days | Discharge: HOME | End: 2024-11-15
Attending: EMERGENCY MEDICINE
Payer: MEDICARE

## 2024-11-15 VITALS
SYSTOLIC BLOOD PRESSURE: 170 MMHG | TEMPERATURE: 98 F | RESPIRATION RATE: 17 BRPM | HEART RATE: 73 BPM | DIASTOLIC BLOOD PRESSURE: 77 MMHG | OXYGEN SATURATION: 97 %

## 2024-11-15 VITALS
HEART RATE: 98 BPM | RESPIRATION RATE: 18 BRPM | WEIGHT: 145.06 LBS | DIASTOLIC BLOOD PRESSURE: 96 MMHG | TEMPERATURE: 97 F | SYSTOLIC BLOOD PRESSURE: 160 MMHG | HEIGHT: 62 IN | OXYGEN SATURATION: 93 %

## 2024-11-15 DIAGNOSIS — Z90.710 ACQUIRED ABSENCE OF BOTH CERVIX AND UTERUS: Chronic | ICD-10-CM

## 2024-11-15 DIAGNOSIS — Z90.49 ACQUIRED ABSENCE OF OTHER SPECIFIED PARTS OF DIGESTIVE TRACT: Chronic | ICD-10-CM

## 2024-11-15 LAB
ADD ON TEST-SPECIMEN IN LAB: SIGNIFICANT CHANGE UP
ALBUMIN SERPL ELPH-MCNC: 4.6 G/DL — SIGNIFICANT CHANGE UP (ref 3.3–5)
ALP SERPL-CCNC: 89 U/L — SIGNIFICANT CHANGE UP (ref 40–120)
ALT FLD-CCNC: 15 U/L — SIGNIFICANT CHANGE UP (ref 10–45)
ANION GAP SERPL CALC-SCNC: 14 MMOL/L — SIGNIFICANT CHANGE UP (ref 5–17)
APTT BLD: 27.9 SEC — SIGNIFICANT CHANGE UP (ref 24.5–35.6)
AST SERPL-CCNC: 21 U/L — SIGNIFICANT CHANGE UP (ref 10–40)
BASOPHILS # BLD AUTO: 0.04 K/UL — SIGNIFICANT CHANGE UP (ref 0–0.2)
BASOPHILS NFR BLD AUTO: 0.4 % — SIGNIFICANT CHANGE UP (ref 0–2)
BILIRUB SERPL-MCNC: 0.3 MG/DL — SIGNIFICANT CHANGE UP (ref 0.2–1.2)
BUN SERPL-MCNC: 20 MG/DL — SIGNIFICANT CHANGE UP (ref 7–23)
CALCIUM SERPL-MCNC: 9.8 MG/DL — SIGNIFICANT CHANGE UP (ref 8.4–10.5)
CHLORIDE SERPL-SCNC: 102 MMOL/L — SIGNIFICANT CHANGE UP (ref 96–108)
CO2 SERPL-SCNC: 24 MMOL/L — SIGNIFICANT CHANGE UP (ref 22–31)
CREAT SERPL-MCNC: 0.68 MG/DL — SIGNIFICANT CHANGE UP (ref 0.5–1.3)
EGFR: 87 ML/MIN/1.73M2 — SIGNIFICANT CHANGE UP
EOSINOPHIL # BLD AUTO: 0.12 K/UL — SIGNIFICANT CHANGE UP (ref 0–0.5)
EOSINOPHIL NFR BLD AUTO: 1.2 % — SIGNIFICANT CHANGE UP (ref 0–6)
GAS PNL BLDV: SIGNIFICANT CHANGE UP
GLUCOSE SERPL-MCNC: 123 MG/DL — HIGH (ref 70–99)
HCT VFR BLD CALC: 43.6 % — SIGNIFICANT CHANGE UP (ref 34.5–45)
HGB BLD-MCNC: 14.3 G/DL — SIGNIFICANT CHANGE UP (ref 11.5–15.5)
IMM GRANULOCYTES NFR BLD AUTO: 0.4 % — SIGNIFICANT CHANGE UP (ref 0–0.9)
INR BLD: 0.94 RATIO — SIGNIFICANT CHANGE UP (ref 0.85–1.16)
LYMPHOCYTES # BLD AUTO: 2.98 K/UL — SIGNIFICANT CHANGE UP (ref 1–3.3)
LYMPHOCYTES # BLD AUTO: 30.5 % — SIGNIFICANT CHANGE UP (ref 13–44)
MCHC RBC-ENTMCNC: 30.4 PG — SIGNIFICANT CHANGE UP (ref 27–34)
MCHC RBC-ENTMCNC: 32.8 G/DL — SIGNIFICANT CHANGE UP (ref 32–36)
MCV RBC AUTO: 92.8 FL — SIGNIFICANT CHANGE UP (ref 80–100)
MONOCYTES # BLD AUTO: 0.61 K/UL — SIGNIFICANT CHANGE UP (ref 0–0.9)
MONOCYTES NFR BLD AUTO: 6.3 % — SIGNIFICANT CHANGE UP (ref 2–14)
NEUTROPHILS # BLD AUTO: 5.97 K/UL — SIGNIFICANT CHANGE UP (ref 1.8–7.4)
NEUTROPHILS NFR BLD AUTO: 61.2 % — SIGNIFICANT CHANGE UP (ref 43–77)
NRBC # BLD: 0 /100 WBCS — SIGNIFICANT CHANGE UP (ref 0–0)
PLATELET # BLD AUTO: 284 K/UL — SIGNIFICANT CHANGE UP (ref 150–400)
POTASSIUM SERPL-MCNC: 4.3 MMOL/L — SIGNIFICANT CHANGE UP (ref 3.5–5.3)
POTASSIUM SERPL-SCNC: 4.3 MMOL/L — SIGNIFICANT CHANGE UP (ref 3.5–5.3)
PROT SERPL-MCNC: 7.7 G/DL — SIGNIFICANT CHANGE UP (ref 6–8.3)
PROTHROM AB SERPL-ACNC: 10.8 SEC — SIGNIFICANT CHANGE UP (ref 9.9–13.4)
RBC # BLD: 4.7 M/UL — SIGNIFICANT CHANGE UP (ref 3.8–5.2)
RBC # FLD: 11.9 % — SIGNIFICANT CHANGE UP (ref 10.3–14.5)
SODIUM SERPL-SCNC: 140 MMOL/L — SIGNIFICANT CHANGE UP (ref 135–145)
TROPONIN T, HIGH SENSITIVITY RESULT: 8 NG/L — SIGNIFICANT CHANGE UP (ref 0–51)
WBC # BLD: 9.76 K/UL — SIGNIFICANT CHANGE UP (ref 3.8–10.5)
WBC # FLD AUTO: 9.76 K/UL — SIGNIFICANT CHANGE UP (ref 3.8–10.5)

## 2024-11-15 PROCEDURE — 83605 ASSAY OF LACTIC ACID: CPT

## 2024-11-15 PROCEDURE — 85730 THROMBOPLASTIN TIME PARTIAL: CPT

## 2024-11-15 PROCEDURE — 96375 TX/PRO/DX INJ NEW DRUG ADDON: CPT | Mod: XU

## 2024-11-15 PROCEDURE — 85018 HEMOGLOBIN: CPT

## 2024-11-15 PROCEDURE — 96374 THER/PROPH/DIAG INJ IV PUSH: CPT | Mod: XU

## 2024-11-15 PROCEDURE — 93005 ELECTROCARDIOGRAM TRACING: CPT

## 2024-11-15 PROCEDURE — 70450 CT HEAD/BRAIN W/O DYE: CPT | Mod: MC

## 2024-11-15 PROCEDURE — 99291 CRITICAL CARE FIRST HOUR: CPT

## 2024-11-15 PROCEDURE — 99285 EMERGENCY DEPT VISIT HI MDM: CPT | Mod: 25

## 2024-11-15 PROCEDURE — 82947 ASSAY GLUCOSE BLOOD QUANT: CPT

## 2024-11-15 PROCEDURE — 82435 ASSAY OF BLOOD CHLORIDE: CPT

## 2024-11-15 PROCEDURE — 85610 PROTHROMBIN TIME: CPT

## 2024-11-15 PROCEDURE — 83036 HEMOGLOBIN GLYCOSYLATED A1C: CPT

## 2024-11-15 PROCEDURE — 82330 ASSAY OF CALCIUM: CPT

## 2024-11-15 PROCEDURE — 80053 COMPREHEN METABOLIC PANEL: CPT

## 2024-11-15 PROCEDURE — 70496 CT ANGIOGRAPHY HEAD: CPT | Mod: MC

## 2024-11-15 PROCEDURE — 85014 HEMATOCRIT: CPT

## 2024-11-15 PROCEDURE — 82803 BLOOD GASES ANY COMBINATION: CPT

## 2024-11-15 PROCEDURE — 70498 CT ANGIOGRAPHY NECK: CPT | Mod: MC

## 2024-11-15 PROCEDURE — 84484 ASSAY OF TROPONIN QUANT: CPT

## 2024-11-15 PROCEDURE — 70496 CT ANGIOGRAPHY HEAD: CPT | Mod: 26,MC

## 2024-11-15 PROCEDURE — 82962 GLUCOSE BLOOD TEST: CPT

## 2024-11-15 PROCEDURE — 84132 ASSAY OF SERUM POTASSIUM: CPT

## 2024-11-15 PROCEDURE — 85025 COMPLETE CBC W/AUTO DIFF WBC: CPT

## 2024-11-15 PROCEDURE — 80061 LIPID PANEL: CPT

## 2024-11-15 PROCEDURE — 70450 CT HEAD/BRAIN W/O DYE: CPT | Mod: 26,MC,XU

## 2024-11-15 PROCEDURE — 70498 CT ANGIOGRAPHY NECK: CPT | Mod: 26,MC

## 2024-11-15 PROCEDURE — 84295 ASSAY OF SERUM SODIUM: CPT

## 2024-11-15 RX ORDER — ASPIRIN/MAG CARB/ALUMINUM AMIN 325 MG
1 TABLET ORAL
Qty: 21 | Refills: 0
Start: 2024-11-15 | End: 2024-12-05

## 2024-11-15 RX ORDER — ASPIRIN/MAG CARB/ALUMINUM AMIN 325 MG
81 TABLET ORAL ONCE
Refills: 0 | Status: DISCONTINUED | OUTPATIENT
Start: 2024-11-15 | End: 2024-11-19

## 2024-11-15 RX ORDER — METOCLOPRAMIDE HCL 10 MG
10 TABLET ORAL ONCE
Refills: 0 | Status: COMPLETED | OUTPATIENT
Start: 2024-11-15 | End: 2024-11-15

## 2024-11-15 RX ORDER — ACETAMINOPHEN 500 MG
1000 TABLET ORAL ONCE
Refills: 0 | Status: COMPLETED | OUTPATIENT
Start: 2024-11-15 | End: 2024-11-15

## 2024-11-15 RX ORDER — CLOPIDOGREL 75 MG/1
300 TABLET ORAL ONCE
Refills: 0 | Status: DISCONTINUED | OUTPATIENT
Start: 2024-11-15 | End: 2024-11-19

## 2024-11-15 RX ORDER — CLOPIDOGREL 75 MG/1
1 TABLET ORAL
Qty: 21 | Refills: 0
Start: 2024-11-15 | End: 2024-12-05

## 2024-11-15 RX ORDER — SODIUM CHLORIDE 9 MG/ML
1000 INJECTION, SOLUTION INTRAMUSCULAR; INTRAVENOUS; SUBCUTANEOUS ONCE
Refills: 0 | Status: COMPLETED | OUTPATIENT
Start: 2024-11-15 | End: 2024-11-15

## 2024-11-15 RX ADMIN — Medication 10 MILLIGRAM(S): at 16:59

## 2024-11-15 RX ADMIN — SODIUM CHLORIDE 1000 MILLILITER(S): 9 INJECTION, SOLUTION INTRAMUSCULAR; INTRAVENOUS; SUBCUTANEOUS at 16:59

## 2024-11-15 RX ADMIN — Medication 400 MILLIGRAM(S): at 16:58

## 2024-11-15 NOTE — ED PROVIDER NOTE - PATIENT PORTAL LINK FT
You can access the FollowMyHealth Patient Portal offered by [facility] by registering at the following website: [email or web address]. By joining [employer]’s FollowMyHealth portal, you will also be able to view your health information using other applications (apps) compatible with our system.

## 2024-11-15 NOTE — ED ADULT NURSE NOTE - NS ED NURSE DC INFO COMPLEXITY
Simple: Patient demonstrates quick and easy understanding/Patient asked questions/Verbalized Understanding Simple: Patient demonstrates quick and easy understanding/Verbalized Understanding

## 2024-11-15 NOTE — ED PROVIDER NOTE - CLINICAL SUMMARY MEDICAL DECISION MAKING FREE TEXT BOX
Reji Nieves, PGY3 -      This is a 79-year-old female with past medical history of hypertension hyperlipidemia and TIA presenting today for left side arm numbness that started at 9 AM which is about 7 hours and 30 minutes prior to presentation and also left-sided facial numbness that occurred around 11 AM which was 5 hours prior to presentation that lasted for about an hour or so not really completely resolved.  No trouble speaking.  No fever no chills.  Also has headache that is moderate to severe on the right side of the head.  No vision changes.  No nausea or vomiting.  No chest pain or shortness of breath.  No numbness weakness at this moment.  Code stroke was called.  Vital signs show hypertension to 160 over 90s otherwise within normal limits.  Physical exam shows well-appearing female not in acute distress.  No focal neurodeficits.  Cranial nerves are intact.  No obvious nystagmus.  No tenderness to palpation of the temporal area either on the right or left side.  Will obtain imaging and labs.  Will give migraine cocktail with Ofirmev normal saline and Reglan. This patient is out of window for TNK and also does not have debiliating neuro deficits. Most likely not LVO without any focal neuro defcitis. Disposition pending labs imaging and neuro eval and recommendation.

## 2024-11-15 NOTE — CONSULT NOTE ADULT - SUBJECTIVE AND OBJECTIVE BOX
**STROKE CODE CONSULT NOTE**    Last known well time/Time of onset of symptoms: 11/15 0900    HPI: 80 yo F with HTN, HLD reports to the hospital 2/2 left sided numbness onset 0945. Neurology consulted for left sided numbness. Patient states she woke up fine without any issue but then suddenly she had an episode of left hand numbness.     SOCIAL HISTORY:    PAST MEDICAL & SURGICAL HISTORY:  HTN (hypertension)      HLD (hyperlipidemia)      History of TIAs      History of appendectomy      H/O total hysterectomy          FAMILY HISTORY:  Family history of breast cancer (Mother)    Family history of stroke (Grandparent)    FH: heart disease (Father)        ROS:  Constitutional: No fever  Eyes: No visual disturbances  ENMT:  No difficulty hearing, tinnitus, vertigo; No sinus or throat pain  Neck: No pain or stiffness  Respiratory: No cough, wheezing, chills or hemoptysis  Cardiovascular: No chest pain, palpitations, shortness of breath, dizziness or leg swelling  Gastrointestinal: No abdominal pain. No nausea, vomiting or hematemesis; No diarrhea or constipation. Nohematochezia.  Genitourinary: No dysuria, frequency, hematuria or incontinence  Neurological: As per HPI    MEDICATIONS  (STANDING):    MEDICATIONS  (PRN):      Allergies    penicillin (Rash)    Intolerances        Vital Signs Last 24 Hrs  T(C): 36.3 (15 Nov 2024 16:08), Max: 36.3 (15 Nov 2024 16:08)  T(F): 97.3 (15 Nov 2024 16:08), Max: 97.3 (15 Nov 2024 16:08)  HR: 98 (15 Nov 2024 16:08) (98 - 98)  BP: 160/96 (15 Nov 2024 16:08) (160/96 - 160/96)  BP(mean): --  RR: 18 (15 Nov 2024 16:08) (18 - 18)  SpO2: 93% (15 Nov 2024 16:08) (93% - 93%)    Parameters below as of 15 Nov 2024 16:08  Patient On (Oxygen Delivery Method): room air        PHYSICAL EXAM:  Constitutional: WDWN; NAD  Cardiovascular: RRR, no appreciable murmurs; no carotid bruits  Neurologic:  Mental status: Awake, alert and oriented ().  Recent and remote memory intact.  Naming, repetition and comprehension intact.  Attention/concentration intact.  No dysarthria, no aphasia.  Fund of knowledge appropriate.    Cranial nerves: Fundoscopic exam demonstrated no abnormalities, pupils equally round and reactive to light, visual fields full, no nystagmus, extraocular muscles intact, V1 through V3 intact bilaterally and symmetric, face symmetric, hearing intact to finger rub, palate elevation symmetric, tongue was midline, sternocleidomastoid/shoulder shrug strength bilaterally 5/5.    Motor:  Normal bulk and tone, strength 5/5 in bilateral upper and lower extremities.   strength 5/5.  Rapid alternating movements intact and symmetric.   Sensation: Intact to light touch, proprioception, and pinprick.  No neglect.   Coordination: No dysmetria on finger-to-nose and heel-to-shin.  No clumsiness.  Reflexes: 2+ in upper and lower extremities, downgoing toes bilaterally  Gait: Narrow and steady. No ataxia.  Romberg negative    NIHSS:    Fingerstick Blood Glucose: CAPILLARY BLOOD GLUCOSE      POCT Blood Glucose.: 112 mg/dL (15 Nov 2024 16:21)       LABS:                        14.3   9.76  )-----------( 284      ( 15 Nov 2024 16:28 )             43.6                     RADIOLOGY & ADDITIONAL STUDIES:    IV-tenecteplase(Y/N):                                   Bolus time:  Reason IV-tenecteplase not given:   **STROKE CODE CONSULT NOTE**    Last known well time/Time of onset of symptoms: 11/15 0900    HPI: 78 yo F with HTN, HLD reports to the hospital 2/2 left sided numbness onset 0945. Neurology consulted for left sided numbness. Patient states she woke up fine without any issue but then suddenly she had an episode of left hand numbness, described as tingling sensation around 0945 lasting for a few mins, and resovled. Patient states around 1100, patient had some numbness/tingling on the left sided of the face, primarily in her cheeks. Following this, patient had a growing throbbing/pulsating headache on the right side of her head. Patient denies any weakness, vision changes, difficulty speaking or swallowing or gait imbalance. Patient reports she had a prior episode of word finding difficulties preceded by a headache, patient was worked up for stroke at that time, CTA negative, MRI negative, EEG showed no seizures and followed up outpatient with neurology who were not sure if this was viral encephalitis versus MRI-negative stroke. ILR and echo were unremarkable at that time. Patient takes ASA 81mg daily, statin and BP medications and is compliant.   Patient denies any drug allergies, denies any hsitory of tobacco use, alcohol use or other recreational drug use such as heroin, marijuana or cocaine.       SOCIAL HISTORY:    PAST MEDICAL & SURGICAL HISTORY:  HTN (hypertension)      HLD (hyperlipidemia)      History of TIAs      History of appendectomy      H/O total hysterectomy          FAMILY HISTORY:  Family history of breast cancer (Mother)    Family history of stroke (Grandparent)    FH: heart disease (Father)        ROS:  Constitutional: No fever  Eyes: No visual disturbances  ENMT:  No difficulty hearing, tinnitus, vertigo; No sinus or throat pain  Neck: No pain or stiffness  Respiratory: No cough, wheezing, chills or hemoptysis  Cardiovascular: No chest pain, palpitations, shortness of breath, dizziness or leg swelling  Gastrointestinal: No abdominal pain. No nausea, vomiting or hematemesis; No diarrhea or constipation. Nohematochezia.  Genitourinary: No dysuria, frequency, hematuria or incontinence  Neurological: As per HPI    MEDICATIONS  (STANDING):    MEDICATIONS  (PRN):      Allergies    penicillin (Rash)    Intolerances        Vital Signs Last 24 Hrs  T(C): 36.3 (15 Nov 2024 16:08), Max: 36.3 (15 Nov 2024 16:08)  T(F): 97.3 (15 Nov 2024 16:08), Max: 97.3 (15 Nov 2024 16:08)  HR: 98 (15 Nov 2024 16:08) (98 - 98)  BP: 160/96 (15 Nov 2024 16:08) (160/96 - 160/96)  BP(mean): --  RR: 18 (15 Nov 2024 16:08) (18 - 18)  SpO2: 93% (15 Nov 2024 16:08) (93% - 93%)    Parameters below as of 15 Nov 2024 16:08  Patient On (Oxygen Delivery Method): room air        PHYSICAL EXAM:  Constitutional: WDWN; NAD  Cardiovascular: RRR, no appreciable murmurs; no carotid bruits  Neurologic:  Mental status: Awake, alert and oriented ().  Recent and remote memory intact.  Naming, repetition and comprehension intact.  Attention/concentration intact.  No dysarthria, no aphasia.  Fund of knowledge appropriate.    Cranial nerves: Fundoscopic exam demonstrated no abnormalities, pupils equally round and reactive to light, visual fields full, no nystagmus, extraocular muscles intact, V1 through V3 intact bilaterally and symmetric, face symmetric, hearing intact to finger rub, palate elevation symmetric, tongue was midline, sternocleidomastoid/shoulder shrug strength bilaterally 5/5.    Motor:  Normal bulk and tone, strength 5/5 in bilateral upper and lower extremities.   strength 5/5.  Rapid alternating movements intact and symmetric.   Sensation: Intact to light touch, proprioception, and pinprick.  No neglect.   Coordination: No dysmetria on finger-to-nose and heel-to-shin.  No clumsiness.  Reflexes: 2+ in upper and lower extremities, downgoing toes bilaterally  Gait: Narrow and steady. No ataxia.  Romberg negative    NIHSS: 0  mRS: 1  ABCD2: 1    Fingerstick Blood Glucose: CAPILLARY BLOOD GLUCOSE      POCT Blood Glucose.: 112 mg/dL (15 Nov 2024 16:21)       LABS:                        14.3   9.76  )-----------( 284      ( 15 Nov 2024 16:28 )             43.6                     RADIOLOGY & ADDITIONAL STUDIES:    IV-tenecteplase(Y/N):               N                    Bolus time: N  Reason IV-tenecteplase not given: Outside time window   **STROKE CODE CONSULT NOTE**    Last known well time/Time of onset of symptoms: 11/15 0900    HPI: 80 yo F with HTN, HLD reports to the hospital 2/2 left sided numbness onset 0945. Neurology consulted for left sided numbness. Patient states she woke up fine without any issue but then suddenly she had an episode of left hand numbness, described as tingling sensation around 0945 lasting for a few mins, and resovled. Patient states around 1100, patient had some numbness/tingling on the left sided of the face, primarily in her cheeks. Following this, patient had a growing throbbing/pulsating headache on the right side of her head. Patient denies any weakness, vision changes, difficulty speaking or swallowing or gait imbalance. Patient reports she had a prior episode of word finding difficulties preceded by a headache, patient was worked up for stroke at that time, CTA negative, MRI negative, EEG showed no seizures and followed up outpatient with neurology who were not sure if this was viral encephalitis versus MRI-negative stroke. ILR and echo were unremarkable at that time. Patient takes ASA 81mg daily, statin and BP medications and is compliant.   Patient denies any drug allergies, denies any history of tobacco use, alcohol use or other recreational drug use such as heroin, marijuana or cocaine.       SOCIAL HISTORY:    PAST MEDICAL & SURGICAL HISTORY:  HTN (hypertension)      HLD (hyperlipidemia)      History of TIAs      History of appendectomy      H/O total hysterectomy          FAMILY HISTORY:  Family history of breast cancer (Mother)    Family history of stroke (Grandparent)    FH: heart disease (Father)        ROS:  Constitutional: No fever  Eyes: No visual disturbances  ENMT:  No difficulty hearing,  Neck: No pain or stiffness  Respiratory: No cough, wheezing,  Cardiovascular: No chest pain, palpitations, shortness of breath  Gastrointestinal: No abdominal pain. No nausea, vomiting  Genitourinary: No dysuria, frequency, hematuria or incontinence  Neurological: As per HPI    MEDICATIONS  (STANDING):    MEDICATIONS  (PRN):      Allergies    penicillin (Rash)    Intolerances        Vital Signs Last 24 Hrs  T(C): 36.3 (15 Nov 2024 16:08), Max: 36.3 (15 Nov 2024 16:08)  T(F): 97.3 (15 Nov 2024 16:08), Max: 97.3 (15 Nov 2024 16:08)  HR: 98 (15 Nov 2024 16:08) (98 - 98)  BP: 160/96 (15 Nov 2024 16:08) (160/96 - 160/96)  BP(mean): --  RR: 18 (15 Nov 2024 16:08) (18 - 18)  SpO2: 93% (15 Nov 2024 16:08) (93% - 93%)    Parameters below as of 15 Nov 2024 16:08  Patient On (Oxygen Delivery Method): room air        PHYSICAL EXAM:  Constitutional: WDWN; NAD  Neurologic:  Mental status: Awake, alert and oriented to month, age, location, person.  Recent and remote memory intact.  Naming, repetition and comprehension intact.  Attention/concentration intact.  No dysarthria, fluent speech.  Cranial nerves: Pupils equally round and reactive to light, visual fields full, no nystagmus, extraocular muscles intact, V1 through V3 intact bilaterally and symmetric, face symmetric, palate elevation symmetric, tongue was midline, sternocleidomastoid/shoulder shrug strength bilaterally 5/5.    Motor:  Normal bulk and tone, strength 5/5 in bilateral upper and lower extremities.   strength 5/5.   Sensation: Intact to light touch, no extinction  Coordination: No dysmetria on finger-to-nose and heel-to-shin.  Reflexes: 2+ in upper and lower extremities  Gait: Narrow and steady    NIHSS: 0  mRS: 1  ABCD2: 2    Fingerstick Blood Glucose: CAPILLARY BLOOD GLUCOSE      POCT Blood Glucose.: 112 mg/dL (15 Nov 2024 16:21)       LABS:                        14.3   9.76  )-----------( 284      ( 15 Nov 2024 16:28 )             43.6                     RADIOLOGY & ADDITIONAL STUDIES:  CT brain 11/14  No hydrocephalus, acute intracranial hemorrhage, mass effect, or brain edema.  CTA brain 11/14  No flow-limiting stenosis or vascular aneurysm. No AVM.  CTA neck 11/14  No flow-limiting stenosis, evidence for arterial dissection, or vascular aneurysm.    IV-tenecteplase(Y/N):     N                    Bolus time: N/a  Reason IV-tenecteplase not given: Outside time window

## 2024-11-15 NOTE — STROKE CODE NOTE - PATIENT LAST KNOWN
Ongoing SW/CM Assessment/Plan of Care Note     See SW/CM flowsheets for goals and other objective data.    Patient/Family discharge goal (s):             PT Recommendation:     Recommendation for Discharge: PT IL: Patient is appropriate for daily Physical Therapy    OT Recommendation:     Recommendations for Discharge: OT IL: Patient is appropriate for daily Occupational Therapy    SLP Recommendation:       Disposition:  Planned Discharge Destination: Rehabilitation/Skilled Care    Progress note:   ALONZO desouzak with [facility]. Shanika spk with [facility] and pt will receive 6 sessions of therapy for 15 min a day. RANI was not happy and is searching for a facility that offers more therapy for the pt.     4:38 PM  ALONZO messaged facilities asking how much PT pt will receive.          Known

## 2024-11-15 NOTE — ED PROVIDER NOTE - PROGRESS NOTE DETAILS
Observation and emergency department MRI is offered to the patient, however the patient declines.  They would like to go home and follow up with their private physician and perform the test as an outpatient.  All risks, benefits and alternative therapies/imaging have been discussed with patient and available support/family in detail in layman terms to facilitate discussion. Time for questions given to patient/support/family and no further questions asked to be answered. Patient declines the therapy/test and is aware of the potential consequences. Patient will follow up with their primary physician. Reji Nieves, PGY3 - Offered CDU stay however patient would like to follow-up outpatient with neurology.  Explained all the risks versus benefits patient understands.  Neurology also recommend CDU for MRI however they are also okay with following up outpatient.  Will DC with aspirin and Plavix.  Patient rate takes rosuvastatin.  Continue to take that medication as instructed.

## 2024-11-15 NOTE — ED PROVIDER NOTE - NSFOLLOWUPINSTRUCTIONS_ED_ALL_ED_FT
Please call and set up a follow up appointment with stroke neurology at [address] [place] [address] . Please call [phone] to schedule this appointment.    Follow up with your neurologist in 2-3 days or call our clinic at [phone].     There were no signs of an emergency medical condition on completion of today's workup.  You will need further medical care and evaluation. A presumptive diagnosis of L face and arm numbness has been made, however further evaluation may be required by your primary care doctor or specialist for a definitive diagnosis.      Follow up with your medical doctor in 2-3 days or call our clinic at [phone] and state you were seen in the Emergency Department and would like to be seen in clinic. You may also call [phone] to speak with a representative to assist follow up care with medicine, surgery, or specialists.    Take atorvastatin, aspirin and plavix as prescribed.    Be sure to take no more than 4000mg or 4g of Tylenol/acetaminophen in a 24 hour period. Be sure to check your other medications to see if they include Tylenol/acetaminophen and include them in your calculations to ensure you do not take more than 4000mg or 4g of Tylenol/acetaminophen a day.    Drink at least 2 Liters or 64 Ounces of water each day (UNLESS you are supposed to restrict fluids or have a history of congestive heart failure (CHF)).    Return for any persistent, worsening symptoms, or ANY concerns at all. Please call and set up a follow up appointment with stroke neurology at [address] [place] [address] . Please call [phone] to schedule this appointment.    There were no signs of an emergency medical condition on completion of today's workup.  You will need further medical care and evaluation. A presumptive diagnosis of L face and arm numbness has been made, however further evaluation may be required by your primary care doctor or specialist for a definitive diagnosis.      Follow up with your medical doctor in 2-3 days or call our clinic at [phone] and state you were seen in the Emergency Department and would like to be seen in clinic. You may also call [phone] to speak with a representative to assist follow up care with medicine, surgery, or specialists.    Take atorvastatin, aspirin and plavix as prescribed.    Be sure to take no more than 4000mg or 4g of Tylenol/acetaminophen in a 24 hour period. Be sure to check your other medications to see if they include Tylenol/acetaminophen and include them in your calculations to ensure you do not take more than 4000mg or 4g of Tylenol/acetaminophen a day.    Drink at least 2 Liters or 64 Ounces of water each day (UNLESS you are supposed to restrict fluids or have a history of congestive heart failure (CHF)).    Return for any persistent, worsening symptoms, or ANY concerns at all.

## 2024-11-15 NOTE — ED ADULT TRIAGE NOTE - BEFAST BALANCE
Writer instructed patient to sign a LORENZO at her last visit on 5/5/2023. Karen may have signed LORENZO although it has not scanned into her chart as of yet. If signed writer will check chart throughout the week and when visible will contact patient's therapist.    No

## 2024-11-15 NOTE — CONSULT NOTE ADULT - ASSESSMENT
80 yo F with HTN, HLD reports to the hospital 2/2 left sided numbness onset 0945. Neurology consulted for left sided numbness. Patient states she woke up fine without any issue but then suddenly she had an episode of left hand numbness, described as tingling sensation around 0945 lasting for a few mins, and resovled. Patient states around 1100, patient had some numbness/tingling on the left sided of the face, primarily in her cheeks. Following this, patient had a growing throbbing/pulsating headache on the right side of her head. Similar episode with disorientation preceded by a headache, unsure of etiology at that time. Patient takes ASA daily. CTH/CTA negative. Physical exam shows no focal deficits.     LKW: 11/15 0900  NIHSS:  0  Baseline MRS: 1  Not a teneceteplase candidate due to no symptoms, outside time window  Not a thrombectomy candidate due to  no symptoms, no LVO    CT head: No acute infarct or hemohharge  CTA/P: No LVO    Impression: Left transient sensory symptoms, localization likely R basal ganglia or right post-central gyrus, etiology unsure at this time, TIA vs migraine with aura at this time    Recommendations  - Admit to CDU for MRI Brain w/o contrast  [] Load Plavix with 300mg x1 then 75mg daily. Continue with aspirin 81mg and Plavix 75mg for 3 weeks followed by aspirin 81 alone  [] Atorvastatin mg daily titrated per ASCVD  [] HgbA1C, fasting lipid panel, CBC, CMP, coag panel, troponin  [] telemetry to check for arrhythmia, EKG, will discuss loop recorder  - If not amenable to CDU stay, can f/u outpatient with stroke neurology at [address] [place] [address] after discharge. Please instruct the patient to call [phone] to schedule this appointment.      - Tight glucose control (long-term goal HgbA1c < 6%)  - Stroke education and counseling  - Neuro-checks and VS q4h  - Dysphagia screen. If fails, speech/swallow eval  - aspiration, fall precautions  - STAT CT head non-contrast for change in neuro exam.   - PT/ OT / DVT ppx per primary team     Discussed with attending  Richard Libman  regarding decision against candidacy for tenecteplase thrombectomy. Recommendations will be complete once signed by attending.

## 2024-11-15 NOTE — ED PROVIDER NOTE - ATTENDING CONTRIBUTION TO CARE
Chief Complaint:    - Right side facial drip and headache   HPI:    - 79-year-old female Karen Sheldon, presented as a reported code stroke. The patient's chief complaint consisted of a left-sided facial numbness that began at 9 a.m. and a headache that started at 2 p.m. She also has symptoms of left facial and left extremity tingling. Noteworthy conditions include a possible Transient Ischemic Attack (TIA), although less likely compared to a complex migraine or other intracranial pathology. Her symptoms do not appear to improve or worsen with any specific activities or circumstances currently.   Review of Systems:    -  Left facial and left extremity tingling, right-sided headache   Physical Examination:    -  General: The patient is alert and oriented, cn 2-12 intact, 5/5 str and sensory bilateral upper and lower extremities, non-tachycardic, non-tachypneic    Labs and Studies:    -  CBC, CMP, PTINR, VBG, troponin, CT head, CTA head and neck, cardiac enzymes, EKG, Chest x-ray   Medical Decision Making:    -  Patient Karen Sheldon, an 79 year old female, presented with a left side facial numbness and a concurrent right sided headache, both of which started earlier in the day. A code stroke was implemented considering the serious nature of the symptoms. The main diagnostic consideration at present is an occurrence of a transient ischemic attack, although differential diagnoses of complex migraine or other intracranial pathology are also being considered. A series of labs and imaging tests were ordered, these include: CBC, CMP, PTINR, VBG, Troponin, CT Head, CTA Head and neck, Cardiac Enzymes, EKG, and Chest X-ray. Routine monitoring of the patient's condition is being carried out, and a neurology consult will be made. The patient's management plan and disposition will be decided based on the clinical progression and findings from the diagnostic tests.   Impression considered:    -  Transient ischemic attack (TIA), Complex migraine, Intracranial pathology Chief Complaint:    - Right side facial drip and headache   HPI:    - 79-year-old female Karen Sheldon, presented as a reported code stroke. The patient's chief complaint consisted of a left-sided facial numbness that began at 9 a.m. and a headache that started at 2 p.m. She also has symptoms of left facial and left extremity tingling. Noteworthy conditions include a possible Transient Ischemic Attack (TIA), although less likely compared to a complex migraine or other intracranial pathology. Her symptoms do not appear to improve or worsen with any specific activities or circumstances currently.   Review of Systems:    -  Left facial and left extremity tingling, right-sided headache   Physical Examination:    -  General: The patient is alert and oriented, cn 2-12 intact, 5/5 str and sensory bilateral upper and lower extremities, non-tachycardic, non-tachypneic    Labs and Studies:    -  CBC, CMP, PTINR, VBG, troponin, CT head, CTA head and neck, cardiac enzymes, EKG, Chest x-ray   Medical Decision Making:    -  Patient Karen Sheldon, an 79 year old female, presented with a left side facial numbness and a concurrent right sided headache, both of which started earlier in the day. A code stroke was implemented considering the serious nature of the symptoms. The main diagnostic consideration at present is an occurrence of a transient ischemic attack, although differential diagnoses of complex migraine or other intracranial pathology are also being considered. A series of labs and imaging tests were ordered, these include: CBC, CMP, PTINR, VBG, Troponin, CT Head, CTA Head and neck, Cardiac Enzymes, EKG, and Chest X-ray. Routine monitoring of the patient's condition is being carried out, and a neurology consult will be made. The patient's management plan and disposition will be decided based on the clinical progression and findings from the diagnostic tests.   Impression considered:    -  Transient ischemic attack (TIA), Complex migraine, Intracranial pathology    Portions of this note have been documented using voice recognition software. As a result, errors may occur in the transcription process. Effort has been made to correct all grammatical and transcription error, but some may have been missed which may produce sporadic inaccurate transcription or nonsensical phrases.

## 2024-11-15 NOTE — ED ADULT NURSE NOTE - OBJECTIVE STATEMENT
Patient c/o L sided numbness that resolved and R sided headache onset of today. Patient states the numbness started in her L hand and eventually went to her face. Patient states she now only feels R sided headache that she states is the worst headache she ever had. No hx migraines. Code stroke initiated and patient taken to CT. Neuro and Ed team at bedside. Patient denies chest pain, sob, n/v/d, fever, chills. Patient A&Ox4, ambulatory with steady gait. Patient placed on cardiac monitor. Plan of care in progress.

## 2024-11-15 NOTE — ED ADULT NURSE NOTE - NSFALLUNIVINTERV_ED_ALL_ED
Bed/Stretcher in lowest position, wheels locked, appropriate side rails in place/Call bell, personal items and telephone in reach/Instruct patient to call for assistance before getting out of bed/chair/stretcher/Non-slip footwear applied when patient is off stretcher/[place] to call system/Physically safe environment - no spills, clutter or unnecessary equipment/Purposeful proactive rounding/Room/bathroom lighting operational, light cord in reach

## 2024-11-15 NOTE — ED ADULT NURSE REASSESSMENT NOTE - NS ED NURSE REASSESS COMMENT FT1
Report received from KELLY Becker. Pt received A&Ox4, vitals retaken and documented. Bed locked and in lowest position, side rails raised, call bell within reach. Currently pending discharge.

## 2024-11-16 PROBLEM — I10 ESSENTIAL (PRIMARY) HYPERTENSION: Chronic | Status: ACTIVE | Noted: 2022-02-14

## 2024-11-16 PROBLEM — Z86.73 PERSONAL HISTORY OF TRANSIENT ISCHEMIC ATTACK (TIA), AND CEREBRAL INFARCTION WITHOUT RESIDUAL DEFICITS: Chronic | Status: ACTIVE | Noted: 2022-02-14

## 2024-11-16 PROBLEM — E78.5 HYPERLIPIDEMIA, UNSPECIFIED: Chronic | Status: ACTIVE | Noted: 2022-02-14

## 2024-11-18 ENCOUNTER — APPOINTMENT (OUTPATIENT)
Dept: NEUROLOGY | Facility: CLINIC | Age: 79
End: 2024-11-18
Payer: MEDICARE

## 2024-11-18 DIAGNOSIS — G43.009 MIGRAINE W/OUT AURA, NOT INTRACTABLE, W/OUT STATUS MIGRAINOSUS: ICD-10-CM

## 2024-11-18 PROCEDURE — G2211 COMPLEX E/M VISIT ADD ON: CPT

## 2024-11-18 PROCEDURE — 99205 OFFICE O/P NEW HI 60 MIN: CPT

## 2024-11-18 RX ORDER — ESCITALOPRAM OXALATE 10 MG/1
10 TABLET ORAL
Qty: 30 | Refills: 3 | Status: ACTIVE | COMMUNITY
Start: 2024-11-18 | End: 1900-01-01

## 2024-11-18 RX ORDER — LORAZEPAM 0.5 MG/1
0.5 TABLET ORAL
Qty: 3 | Refills: 0 | Status: ACTIVE | COMMUNITY
Start: 2024-11-18 | End: 1900-01-01

## 2024-11-19 PROBLEM — G43.009 ATYPICAL MIGRAINE: Status: ACTIVE | Noted: 2024-11-19

## 2024-11-20 ENCOUNTER — OUTPATIENT (OUTPATIENT)
Dept: OUTPATIENT SERVICES | Facility: HOSPITAL | Age: 79
LOS: 1 days | End: 2024-11-20
Payer: MEDICARE

## 2024-11-20 ENCOUNTER — APPOINTMENT (OUTPATIENT)
Dept: MRI IMAGING | Facility: CLINIC | Age: 79
End: 2024-11-20
Payer: MEDICARE

## 2024-11-20 DIAGNOSIS — Z90.710 ACQUIRED ABSENCE OF BOTH CERVIX AND UTERUS: Chronic | ICD-10-CM

## 2024-11-20 DIAGNOSIS — I63.412 CEREBRAL INFARCTION DUE TO EMBOLISM OF LEFT MIDDLE CEREBRAL ARTERY: ICD-10-CM

## 2024-11-20 PROCEDURE — 70551 MRI BRAIN STEM W/O DYE: CPT

## 2024-11-20 PROCEDURE — 70551 MRI BRAIN STEM W/O DYE: CPT | Mod: 26

## 2024-11-22 ENCOUNTER — APPOINTMENT (OUTPATIENT)
Dept: MRI IMAGING | Facility: CLINIC | Age: 79
End: 2024-11-22

## 2025-02-14 NOTE — DISCHARGE NOTE ADULT - NS AS DC STROKE PERSONAL RISK FACTORS
Roderick Waggoner is a 20 year old male who presents for right thumb pain.    Roderick has redness and swelling that started yesterday, but had noted redness 5 days ago. He works as a  and uses his hands daily.    Vitals:    02/14/25 0803   BP: 132/72   Pulse: 64   Resp: 16   Temp: 98.2 °F (36.8 °C)   TempSrc: Oral   SpO2: 98%   Weight: 82.6 kg (182 lb)     Examination of the patient reveals:   GENERAL: appears stated age, well developed and well nourished, in no distress, and normal affect  SKIN normal color, normal texture, normal turgor, no atypical appearing skin lesions, no bruises, and ABNORMAL FINDINGS>> right thumb paronychia.    Procedure:  Skin was sterilized using alcohol and then the skin was chilled with PainEase and then using an 18 gauge needle was used to lucrecia the abscess. Successfully was able to express all purulent discharge leaving just blood. Bandage placed.    ASSESSMENT/PLAN  ASSESSMENT: Paronychia of finger of right hand  (primary encounter diagnosis)  Plan: cefdinir (OMNICEF) 300 MG capsule, DRAIN SKIN         ABSCESS SIMPLE, DRAIN SKIN ABSCESS SIMPLE  Successful drainage. Antibiotics. Symptomatic cares were discussed. Work note for today.   high blood pressure

## 2025-04-16 ENCOUNTER — EMERGENCY (EMERGENCY)
Facility: HOSPITAL | Age: 80
LOS: 1 days | End: 2025-04-16
Attending: STUDENT IN AN ORGANIZED HEALTH CARE EDUCATION/TRAINING PROGRAM
Payer: MEDICARE

## 2025-04-16 VITALS
OXYGEN SATURATION: 100 % | HEART RATE: 75 BPM | HEIGHT: 62 IN | SYSTOLIC BLOOD PRESSURE: 118 MMHG | RESPIRATION RATE: 20 BRPM | TEMPERATURE: 97 F | DIASTOLIC BLOOD PRESSURE: 71 MMHG | WEIGHT: 145.06 LBS

## 2025-04-16 DIAGNOSIS — Z90.49 ACQUIRED ABSENCE OF OTHER SPECIFIED PARTS OF DIGESTIVE TRACT: Chronic | ICD-10-CM

## 2025-04-16 DIAGNOSIS — Z90.710 ACQUIRED ABSENCE OF BOTH CERVIX AND UTERUS: Chronic | ICD-10-CM

## 2025-04-16 LAB
ALBUMIN SERPL ELPH-MCNC: 4.8 G/DL — SIGNIFICANT CHANGE UP (ref 3.3–5)
ALP SERPL-CCNC: 95 U/L — SIGNIFICANT CHANGE UP (ref 40–120)
ALT FLD-CCNC: 24 U/L — SIGNIFICANT CHANGE UP (ref 10–45)
ANION GAP SERPL CALC-SCNC: 16 MMOL/L — SIGNIFICANT CHANGE UP (ref 5–17)
AST SERPL-CCNC: 22 U/L — SIGNIFICANT CHANGE UP (ref 10–40)
BASOPHILS # BLD AUTO: 0.06 K/UL — SIGNIFICANT CHANGE UP (ref 0–0.2)
BASOPHILS NFR BLD AUTO: 0.5 % — SIGNIFICANT CHANGE UP (ref 0–2)
BILIRUB SERPL-MCNC: 0.4 MG/DL — SIGNIFICANT CHANGE UP (ref 0.2–1.2)
BUN SERPL-MCNC: 26 MG/DL — HIGH (ref 7–23)
CALCIUM SERPL-MCNC: 10.2 MG/DL — SIGNIFICANT CHANGE UP (ref 8.4–10.5)
CHLORIDE SERPL-SCNC: 101 MMOL/L — SIGNIFICANT CHANGE UP (ref 96–108)
CO2 SERPL-SCNC: 22 MMOL/L — SIGNIFICANT CHANGE UP (ref 22–31)
CREAT SERPL-MCNC: 0.88 MG/DL — SIGNIFICANT CHANGE UP (ref 0.5–1.3)
EGFR: 66 ML/MIN/1.73M2 — SIGNIFICANT CHANGE UP
EGFR: 66 ML/MIN/1.73M2 — SIGNIFICANT CHANGE UP
EOSINOPHIL # BLD AUTO: 0.02 K/UL — SIGNIFICANT CHANGE UP (ref 0–0.5)
EOSINOPHIL NFR BLD AUTO: 0.2 % — SIGNIFICANT CHANGE UP (ref 0–6)
GAS PNL BLDV: SIGNIFICANT CHANGE UP
GLUCOSE SERPL-MCNC: 122 MG/DL — HIGH (ref 70–99)
HCT VFR BLD CALC: 43.3 % — SIGNIFICANT CHANGE UP (ref 34.5–45)
HGB BLD-MCNC: 14.7 G/DL — SIGNIFICANT CHANGE UP (ref 11.5–15.5)
IMM GRANULOCYTES NFR BLD AUTO: 0.2 % — SIGNIFICANT CHANGE UP (ref 0–0.9)
LIDOCAIN IGE QN: 30 U/L — SIGNIFICANT CHANGE UP (ref 7–60)
LYMPHOCYTES # BLD AUTO: 1.32 K/UL — SIGNIFICANT CHANGE UP (ref 1–3.3)
LYMPHOCYTES # BLD AUTO: 10.9 % — LOW (ref 13–44)
MCHC RBC-ENTMCNC: 31.7 PG — SIGNIFICANT CHANGE UP (ref 27–34)
MCHC RBC-ENTMCNC: 33.9 G/DL — SIGNIFICANT CHANGE UP (ref 32–36)
MCV RBC AUTO: 93.5 FL — SIGNIFICANT CHANGE UP (ref 80–100)
MONOCYTES # BLD AUTO: 0.46 K/UL — SIGNIFICANT CHANGE UP (ref 0–0.9)
MONOCYTES NFR BLD AUTO: 3.8 % — SIGNIFICANT CHANGE UP (ref 2–14)
NEUTROPHILS # BLD AUTO: 10.22 K/UL — HIGH (ref 1.8–7.4)
NEUTROPHILS NFR BLD AUTO: 84.4 % — HIGH (ref 43–77)
NRBC BLD AUTO-RTO: 0 /100 WBCS — SIGNIFICANT CHANGE UP (ref 0–0)
PLATELET # BLD AUTO: 301 K/UL — SIGNIFICANT CHANGE UP (ref 150–400)
POTASSIUM SERPL-MCNC: 4.5 MMOL/L — SIGNIFICANT CHANGE UP (ref 3.5–5.3)
POTASSIUM SERPL-SCNC: 4.5 MMOL/L — SIGNIFICANT CHANGE UP (ref 3.5–5.3)
PROT SERPL-MCNC: 8.1 G/DL — SIGNIFICANT CHANGE UP (ref 6–8.3)
RBC # BLD: 4.63 M/UL — SIGNIFICANT CHANGE UP (ref 3.8–5.2)
RBC # FLD: 12.2 % — SIGNIFICANT CHANGE UP (ref 10.3–14.5)
SODIUM SERPL-SCNC: 139 MMOL/L — SIGNIFICANT CHANGE UP (ref 135–145)
TROPONIN T, HIGH SENSITIVITY RESULT: 8 NG/L — SIGNIFICANT CHANGE UP (ref 0–51)
WBC # BLD: 12.11 K/UL — HIGH (ref 3.8–10.5)
WBC # FLD AUTO: 12.11 K/UL — HIGH (ref 3.8–10.5)

## 2025-04-16 PROCEDURE — 99284 EMERGENCY DEPT VISIT MOD MDM: CPT

## 2025-04-16 PROCEDURE — 93010 ELECTROCARDIOGRAM REPORT: CPT

## 2025-04-16 PROCEDURE — 70450 CT HEAD/BRAIN W/O DYE: CPT | Mod: 26

## 2025-04-16 RX ADMIN — Medication 1000 MILLILITER(S): at 23:12

## 2025-04-17 VITALS
SYSTOLIC BLOOD PRESSURE: 127 MMHG | HEART RATE: 85 BPM | TEMPERATURE: 98 F | RESPIRATION RATE: 18 BRPM | DIASTOLIC BLOOD PRESSURE: 77 MMHG | OXYGEN SATURATION: 98 %

## 2025-04-17 PROCEDURE — 90715 TDAP VACCINE 7 YRS/> IM: CPT

## 2025-04-17 PROCEDURE — 36000 PLACE NEEDLE IN VEIN: CPT

## 2025-04-17 PROCEDURE — 83690 ASSAY OF LIPASE: CPT

## 2025-04-17 PROCEDURE — 82330 ASSAY OF CALCIUM: CPT

## 2025-04-17 PROCEDURE — 84132 ASSAY OF SERUM POTASSIUM: CPT

## 2025-04-17 PROCEDURE — 90471 IMMUNIZATION ADMIN: CPT

## 2025-04-17 PROCEDURE — 70450 CT HEAD/BRAIN W/O DYE: CPT | Mod: MC

## 2025-04-17 PROCEDURE — 80053 COMPREHEN METABOLIC PANEL: CPT

## 2025-04-17 PROCEDURE — 82803 BLOOD GASES ANY COMBINATION: CPT

## 2025-04-17 PROCEDURE — 99285 EMERGENCY DEPT VISIT HI MDM: CPT | Mod: 25

## 2025-04-17 PROCEDURE — 83605 ASSAY OF LACTIC ACID: CPT

## 2025-04-17 PROCEDURE — 85025 COMPLETE CBC W/AUTO DIFF WBC: CPT

## 2025-04-17 PROCEDURE — 93005 ELECTROCARDIOGRAM TRACING: CPT

## 2025-04-17 PROCEDURE — 82947 ASSAY GLUCOSE BLOOD QUANT: CPT

## 2025-04-17 PROCEDURE — 82435 ASSAY OF BLOOD CHLORIDE: CPT

## 2025-04-17 PROCEDURE — 85018 HEMOGLOBIN: CPT

## 2025-04-17 PROCEDURE — 84295 ASSAY OF SERUM SODIUM: CPT

## 2025-04-17 PROCEDURE — 85014 HEMATOCRIT: CPT

## 2025-04-17 PROCEDURE — 84484 ASSAY OF TROPONIN QUANT: CPT

## 2025-04-21 ENCOUNTER — APPOINTMENT (OUTPATIENT)
Dept: NEUROLOGY | Facility: CLINIC | Age: 80
End: 2025-04-21
Payer: MEDICARE

## 2025-04-21 VITALS
DIASTOLIC BLOOD PRESSURE: 88 MMHG | SYSTOLIC BLOOD PRESSURE: 155 MMHG | HEIGHT: 62 IN | WEIGHT: 145 LBS | HEART RATE: 104 BPM | BODY MASS INDEX: 26.68 KG/M2

## 2025-04-21 DIAGNOSIS — G43.009 MIGRAINE W/OUT AURA, NOT INTRACTABLE, W/OUT STATUS MIGRAINOSUS: ICD-10-CM

## 2025-04-21 DIAGNOSIS — R55 SYNCOPE AND COLLAPSE: ICD-10-CM

## 2025-04-21 PROCEDURE — 99214 OFFICE O/P EST MOD 30 MIN: CPT

## 2025-04-21 PROCEDURE — G2211 COMPLEX E/M VISIT ADD ON: CPT

## 2025-04-21 RX ORDER — MECLIZINE HYDROCHLORIDE 12.5 MG/1
12.5 TABLET ORAL DAILY
Qty: 15 | Refills: 0 | Status: ACTIVE | COMMUNITY
Start: 2025-04-21 | End: 1900-01-01

## 2025-04-23 DIAGNOSIS — I63.412 CEREBRAL INFARCTION DUE TO EMBOLISM OF LEFT MIDDLE CEREBRAL ARTERY: ICD-10-CM

## 2025-04-23 DIAGNOSIS — Z86.73 PERSONAL HISTORY OF TRANSIENT ISCHEMIC ATTACK (TIA), AND CEREBRAL INFARCTION W/OUT RESIDUAL DEFICITS: ICD-10-CM
